# Patient Record
Sex: FEMALE | Race: WHITE | NOT HISPANIC OR LATINO | Employment: OTHER | URBAN - METROPOLITAN AREA
[De-identification: names, ages, dates, MRNs, and addresses within clinical notes are randomized per-mention and may not be internally consistent; named-entity substitution may affect disease eponyms.]

---

## 2022-11-07 ENCOUNTER — OFFICE VISIT (OUTPATIENT)
Dept: ORTHOPEDICS | Facility: CLINIC | Age: 72
End: 2022-11-07
Payer: MEDICARE

## 2022-11-07 ENCOUNTER — HOSPITAL ENCOUNTER (OUTPATIENT)
Dept: RADIOLOGY | Facility: HOSPITAL | Age: 72
Discharge: HOME | End: 2022-11-07
Attending: ORTHOPAEDIC SURGERY
Payer: MEDICARE

## 2022-11-07 DIAGNOSIS — M51.34 DDD (DEGENERATIVE DISC DISEASE), THORACIC: ICD-10-CM

## 2022-11-07 DIAGNOSIS — M51.34 DDD (DEGENERATIVE DISC DISEASE), THORACIC: Primary | ICD-10-CM

## 2022-11-07 DIAGNOSIS — S22.000G COMPRESSION FRACTURE OF THORACIC VERTEBRA WITH DELAYED HEALING, UNSPECIFIED THORACIC VERTEBRAL LEVEL, SUBSEQUENT ENCOUNTER: ICD-10-CM

## 2022-11-07 PROCEDURE — 1159F PR MEDICATION LIST DOCUMENTED IN MEDICAL RECORD: ICD-10-PCS | Mod: CPTII,S$GLB,, | Performed by: ORTHOPAEDIC SURGERY

## 2022-11-07 PROCEDURE — 1159F MED LIST DOCD IN RCRD: CPT | Mod: CPTII,S$GLB,, | Performed by: ORTHOPAEDIC SURGERY

## 2022-11-07 PROCEDURE — 72080 XR THORACOLUMBAR SPINE AP LATERAL: ICD-10-PCS | Mod: 26,,, | Performed by: RADIOLOGY

## 2022-11-07 PROCEDURE — 99999 PR PBB SHADOW E&M-NEW PATIENT-LVL II: CPT | Mod: PBBFAC,,, | Performed by: ORTHOPAEDIC SURGERY

## 2022-11-07 PROCEDURE — 1125F AMNT PAIN NOTED PAIN PRSNT: CPT | Mod: CPTII,S$GLB,, | Performed by: ORTHOPAEDIC SURGERY

## 2022-11-07 PROCEDURE — 72080 X-RAY EXAM THORACOLMB 2/> VW: CPT | Mod: TC

## 2022-11-07 PROCEDURE — 4010F PR ACE/ARB THEARPY RXD/TAKEN: ICD-10-PCS | Mod: CPTII,S$GLB,, | Performed by: ORTHOPAEDIC SURGERY

## 2022-11-07 PROCEDURE — 99999 PR PBB SHADOW E&M-NEW PATIENT-LVL II: ICD-10-PCS | Mod: PBBFAC,,, | Performed by: ORTHOPAEDIC SURGERY

## 2022-11-07 PROCEDURE — 1125F PR PAIN SEVERITY QUANTIFIED, PAIN PRESENT: ICD-10-PCS | Mod: CPTII,S$GLB,, | Performed by: ORTHOPAEDIC SURGERY

## 2022-11-07 PROCEDURE — 99204 PR OFFICE/OUTPT VISIT, NEW, LEVL IV, 45-59 MIN: ICD-10-PCS | Mod: S$GLB,,, | Performed by: ORTHOPAEDIC SURGERY

## 2022-11-07 PROCEDURE — 4010F ACE/ARB THERAPY RXD/TAKEN: CPT | Mod: CPTII,S$GLB,, | Performed by: ORTHOPAEDIC SURGERY

## 2022-11-07 PROCEDURE — 72080 X-RAY EXAM THORACOLMB 2/> VW: CPT | Mod: 26,,, | Performed by: RADIOLOGY

## 2022-11-07 PROCEDURE — 99204 OFFICE O/P NEW MOD 45 MIN: CPT | Mod: S$GLB,,, | Performed by: ORTHOPAEDIC SURGERY

## 2022-11-07 RX ORDER — COLESTIPOL HYDROCHLORIDE 5 G/5G
5 GRANULE, FOR SUSPENSION ORAL DAILY
COMMUNITY

## 2022-11-07 RX ORDER — METFORMIN HYDROCHLORIDE 500 MG/1
500 TABLET ORAL 2 TIMES DAILY WITH MEALS
COMMUNITY

## 2022-11-07 RX ORDER — AMLODIPINE BESYLATE 10 MG/1
5 TABLET ORAL DAILY
COMMUNITY

## 2022-11-16 ENCOUNTER — HOSPITAL ENCOUNTER (OUTPATIENT)
Dept: RADIOLOGY | Facility: HOSPITAL | Age: 72
Discharge: HOME | End: 2022-11-16
Attending: ORTHOPAEDIC SURGERY
Payer: MEDICARE

## 2022-11-16 DIAGNOSIS — S22.000G COMPRESSION FRACTURE OF THORACIC VERTEBRA WITH DELAYED HEALING, UNSPECIFIED THORACIC VERTEBRAL LEVEL, SUBSEQUENT ENCOUNTER: ICD-10-CM

## 2022-11-16 PROCEDURE — 72146 MRI CHEST SPINE W/O DYE: CPT | Mod: 26,,, | Performed by: RADIOLOGY

## 2022-11-16 PROCEDURE — 72146 MRI THORACIC SPINE WITHOUT CONTRAST: ICD-10-PCS | Mod: 26,,, | Performed by: RADIOLOGY

## 2022-11-16 PROCEDURE — 72146 MRI CHEST SPINE W/O DYE: CPT | Mod: TC

## 2023-01-01 NOTE — PROGRESS NOTES
DATE: 11/7/2022  PATIENT: Nia Godfrey    Supervising Physician: Javi Enriquez M.D.    CHIEF COMPLAINT: mid/low back pain    HISTORY:  Nia Godfrey is a 72 y.o. female here for initial evaluation of mid/low back pain (Back - 3). The pain has been present since a fall in February 2021. Pt tripped and fell forwards. At that point she was diagnosed with a thoracic compression fx that was treated conservatively, but she says the pain has not improved. The patient describes the pain as aching.  The pain is worse with standing and walking and improved by resting and leaning on the shopping cart. There is negative associated numbness and tingling. There is negative subjective weakness. Prior treatments have included no previous PT, ESIs, surgery.    The patient denies myelopathic symptoms such as handwriting changes or difficulty with buttons/coins/keys. Denies perineal paresthesias, bowel/bladder dysfunction.    PAST MEDICAL/SURGICAL HISTORY:  No past medical history on file.  No past surgical history on file.    Current Medications: No current outpatient medications on file.    Social History:   Social History     Socioeconomic History    Marital status:        REVIEW OF SYSTEMS:  Constitution: Negative. Negative for chills, fever and night sweats.   Cardiovascular: Negative for chest pain and syncope.   Respiratory: Negative for cough and shortness of breath.   Gastrointestinal: See HPI. Negative for nausea/vomiting. Negative for abdominal pain.  Genitourinary: See HPI. Negative for discoloration or dysuria.  Skin: Negative for dry skin, itching and rash.   Hematologic/Lymphatic: Negative for bleeding problem. Does not bruise/bleed easily.   Musculoskeletal: Negative for falls and muscle weakness.   Neurological: See HPI. No seizures.   Endocrine: Negative for polydipsia, polyphagia and polyuria.   Allergic/Immunologic: Negative for hives and persistent infections.    PHYSICAL EXAMINATION:    There were  no vitals taken for this visit.    General: The patient is a very pleasant 72 y.o. female in no apparent distress, the patient is oriented to person, place and time.   Psych: Normal mood and affect  HEENT: Vision grossly intact, hearing intact to the spoken word.  Lungs: Respirations unlabored.  Gait: Normal station and gait, no difficulty with toe or heel walk.   Skin: Dorsal lumbar skin negative for rashes, lesions, hairy patches and surgical scars.  Range of motion: Lumbar range of motion is acceptable. There is negative thoracolumbar tenderness to palpation.  Spinal Balance: Global saggital and coronal spinal balance acceptable, no significant for scoliosis and kyphosis.  Musculoskeletal: No pain with the range of motion of the bilateral hips. No trochanteric tenderness to palpation.  Vascular: Bilateral lower extremities warm and well perfused, Dorsalis pedis pulses 2+ bilaterally.  Neurological: Normal strength and tone in all major motor groups in the bilateral lower extremities. Normal sensation to light touch in the L2-S1 dermatomes bilaterally.  Deep tendon reflexes symmetric 2+ in the bilateral lower extremities.  Negative Babinski bilaterally.  Straight leg raise negative bilaterally.     IMAGING:   Today I personally reviewed AP, Lat upright thoracolumbar spine films that demonstrate age indeterminate 30-40% compression abnormality involving the anterior 80-90% of the T12 vertebral segment    There is no height or weight on file to calculate BMI.    No results found for: HGBA1C      ASSESSMENT/PLAN:    Diagnoses and all orders for this visit:    DDD (degenerative disc disease), thoracic  -     X-Ray Thoracolumbar Spine AP Lateral; Future      Today we discussed at length all of the different treatment options including anti-inflammatories, acetaminophen, rest, ice, heat, physical therapy including strengthening and stretching exercises, home exercises, ROM, aerobic conditioning, aqua therapy, other  modalities including ultrasound, massage, and dry needling, epidural steroid injections and finally surgical intervention.      Pt presents with chronic mid back pain. T12 compression fx on xray. Failure of conservative rx. Will obtain MRI to further evaluate and call with results.         .

## 2024-04-04 ENCOUNTER — OFFICE VISIT (OUTPATIENT)
Dept: PAIN MEDICINE | Facility: CLINIC | Age: 74
End: 2024-04-04
Payer: MEDICARE

## 2024-04-04 VITALS — WEIGHT: 169 LBS | BODY MASS INDEX: 31.1 KG/M2 | HEIGHT: 62 IN

## 2024-04-04 DIAGNOSIS — M47.814 ARTHROPATHY OF THORACIC FACET JOINT: ICD-10-CM

## 2024-04-04 DIAGNOSIS — G89.29 CHRONIC BILATERAL LOW BACK PAIN WITHOUT SCIATICA: ICD-10-CM

## 2024-04-04 DIAGNOSIS — M54.50 CHRONIC BILATERAL LOW BACK PAIN WITHOUT SCIATICA: ICD-10-CM

## 2024-04-04 DIAGNOSIS — M54.6 CHRONIC BILATERAL THORACIC BACK PAIN: Primary | ICD-10-CM

## 2024-04-04 DIAGNOSIS — M47.816 LUMBAR FACET ARTHROPATHY: ICD-10-CM

## 2024-04-04 DIAGNOSIS — G89.29 CHRONIC BILATERAL THORACIC BACK PAIN: Primary | ICD-10-CM

## 2024-04-04 PROCEDURE — 1101F PT FALLS ASSESS-DOCD LE1/YR: CPT | Mod: CPTII,S$GLB,, | Performed by: ANESTHESIOLOGY

## 2024-04-04 PROCEDURE — 4010F ACE/ARB THERAPY RXD/TAKEN: CPT | Mod: CPTII,S$GLB,, | Performed by: ANESTHESIOLOGY

## 2024-04-04 PROCEDURE — 99204 OFFICE O/P NEW MOD 45 MIN: CPT | Mod: S$GLB,,, | Performed by: ANESTHESIOLOGY

## 2024-04-04 PROCEDURE — 1159F MED LIST DOCD IN RCRD: CPT | Mod: CPTII,S$GLB,, | Performed by: ANESTHESIOLOGY

## 2024-04-04 PROCEDURE — 99999 PR PBB SHADOW E&M-EST. PATIENT-LVL III: CPT | Mod: PBBFAC,,, | Performed by: ANESTHESIOLOGY

## 2024-04-04 PROCEDURE — 3008F BODY MASS INDEX DOCD: CPT | Mod: CPTII,S$GLB,, | Performed by: ANESTHESIOLOGY

## 2024-04-04 PROCEDURE — 1160F RVW MEDS BY RX/DR IN RCRD: CPT | Mod: CPTII,S$GLB,, | Performed by: ANESTHESIOLOGY

## 2024-04-04 PROCEDURE — 3288F FALL RISK ASSESSMENT DOCD: CPT | Mod: CPTII,S$GLB,, | Performed by: ANESTHESIOLOGY

## 2024-04-04 RX ORDER — SITAGLIPTIN 100 MG/1
100 TABLET, FILM COATED ORAL
COMMUNITY
Start: 2024-02-23

## 2024-04-04 RX ORDER — GABAPENTIN 100 MG/1
100 CAPSULE ORAL 3 TIMES DAILY
Qty: 90 CAPSULE | Refills: 0 | Status: SHIPPED | OUTPATIENT
Start: 2024-04-04 | End: 2024-05-02

## 2024-04-04 RX ORDER — MONTELUKAST SODIUM 10 MG/1
10 TABLET ORAL
COMMUNITY
Start: 2024-03-12

## 2024-04-04 RX ORDER — METHOCARBAMOL 500 MG/1
500 TABLET, FILM COATED ORAL 3 TIMES DAILY PRN
Qty: 90 TABLET | Refills: 0 | Status: SHIPPED | OUTPATIENT
Start: 2024-04-04 | End: 2024-05-09 | Stop reason: SDUPTHER

## 2024-04-04 NOTE — PROGRESS NOTES
[facility] New Patient Evaluation    Nia Godfrey  : 1950  Date: 2024     CHIEF COMPLAINT:  Back Pain  Referring Physician: Cristiana Carrillo*  Primary Care Physician: Cristiana Carrillo, NP    HPI:  This is a 74 y.o. female with a chief complaint of Back Pain  . The patient has Past medical history/Past surgical history of hypertension, hyperlipidemia, diabetes, thoracic compression fracture T11-T12  Patient was evaluated and referred by PCP for low back pain.    Previously, she was evaluated by orthopedic provider in , patient had a recommendation to obtain MRI thoracic spine that showed remote compression fracture at T11 and T12(25 and 40% height loss respectively), degenerative changes, disc extrusion at T8-T9 with no high-grade thoracic spinal canal stenosis neural foramina narrowing    Diabetic: Yes    Anticogualtion drugs: None    Allergy To Iodine: No    Currently on Antibiotic: No    Current Description of Pain Symptoms:    History of Recent Fall or Trauma: No   Onset: Chronic, started 2 years ago(2/3/2022)  Pain Location:  Mid back and lower back(thoracic and lumbar, no SI joint pain, no radiation, no lower extremity symptoms)  Radiates/associated symptoms: none.   Pain is Getting worse over the last 6 months    The pain is described as aching.   Exacerbating factors: Standing and Walking.   Mitigating factors sitting.   Symptoms interfere with daily activity, sleeping.   The patient feels like symptoms have been worsening.   Patient denies night fever/night sweats, urinary incontinence, bowel incontinence, significant weight loss, significant motor weakness, and loss of sensations.    Pain score:   Current: 5/10  Best: 3/10  Worst: 10/10    Current pain medications:   tylenol and ibuprofen   Sometimes she takes percocet for your daughter, last time was in 2023 and during  time    Current Narcotics/Opioid /benzo Medications:  Opioids-  "None  Benzodiazepines: No    UDS:  NA    PDMP:  Reviewed and consistent with medication use as prescribed.     Previous Chronic Pain Treatment History:  Physical Therapy/HEP/Physician Lead Exercise Program:  Over the past 12 months, Patient has done 10 sessions.  PT response: Mildly Helpful.   Dates of the PT sessions: 2023 LOS in [place]   Is patient participating in home exercise program (HEP): Yes> 6 weeks over the last 6 months    Non-interventional Pain Therapy:  []Chiropractor.   []Acupuncture/Dry needle.  []TENS unit.  []Heat/ICE.  []Back Brace.    Medications previously tried:  NSAIDs: Meloxicam (Mobic)  Topical Agent: yes, Lidocaine patch   TCA/SSRI/SNRI: None  Anti-convulsants: None-never tried gabapentin or Lyrica  Muscle Relaxants: Flexeril (Cyclobenzaprine):  Sedation  Opioids- None.    Interventional Pain Procedures:  None     Previous spine/Relevant joint surgery:  none  Surgical History:   has a past surgical history that includes Hysterectomy and Cholecystectomy.  Medical History:   has a past medical history of Allergy, Asthma, Diabetes mellitus, type 2, Hypertension, and Osteopenia.  Family History:  family history includes Hypertension in her mother.  Allergies:  Atorvastatin and Codeine   Social History/SUBSTANCE ABUSE HISTORY:  Personal history of substance abuse: No   reports that she has been smoking cigarettes. She has never used smokeless tobacco.  LABS:  CBC  No results found for: "WBC", "HGB", "HCT"  Coagulation Profile   No results found for: "PLT"  No results found for: "PT", "PTT", "INR"  CMP:  BMP  No results found for: "NA", "K", "CL", "CO2", "BUN", "CREATININE", "CALCIUM", "ANIONGAP", "EGFRNORACEVR"  No results found for: "ALT", "AST", "GGT", "ALKPHOS", "BILITOT"  HGBA1C:  No results found for: "LABA1C", "HGBA1C"    ROS:    Review of Systems   GENERAL:  No weight loss, malaise or fevers.  HEENT:   No recent changes in vision or hearing  NECK:  Negative for lumps, no difficulty with " "swallowing.  RESPIRATORY:  Negative for cough, wheezing or shortness of breath, patient denies any recent URI.  CARDIOVASCULAR:  Negative for chest pain, leg swelling or palpitations.  GI:  Negative for abdominal discomfort, blood in stools or black stools or change in bowel habits.  MUSCULOSKELETAL:  See HPI.  SKIN:  Negative for lesions, rash, and itching.  PSYCH:  No mood disorder or recent psychosocial stressors.   HEMATOLOGY/LYMPHOLOGY:  See the blood thinner sectioned in HPI.  NEURO:  See HPI  All other reviewed and negative other than HPI.    PHYSICAL EXAM:  VITALS: Ht 5' 2" (1.575 m)   Wt 76.7 kg (169 lb)   BMI 30.91 kg/m²   Body mass index is 30.91 kg/m².  GENERAL: Well appearing, in no acute distress, alert and oriented x3, answers questions appropriately.   PSYCH: Flat affect.  SKIN: Skin color, texture, turgor normal, no rashes or lesions.  HEAD/FACE:  Normocephalic, atraumatic. Cranial nerves grossly intact.  CV: Regular rate  PULM: No evidence of respiratory difficulty, symmetric chest rise.  GI:  Soft and non-Distended.  BACK/SIJ/HIP:  Lumbar Spine Exam:       Inspection: No erythema, bruising, + kyphotic posture.      Palpation: (+) TTP of lumbar and thoracic paraspinals bilaterally      ROM:  Limited in flexion, extension, lateral bending.       (+) Facet loading bilaterally      (-) Straight Leg Raise bilaterally      (-) SHANTI bilaterally, Tenderness over the PSIS, Yeoman test  Hip Exam:      Inspection: No gross deformity or apparent leg length discrepancy      Palpation:  + TTP to bilateral greater trochanteric bursas.       ROM:  No limitation or pain in internal rotation, external rotation b/l    GAIT:  Antalgic gait    DIAGNOSTIC STUDIES AND MEDICAL RECORDS REVIEW:  MRI THORACIC SPINE WITHOUT CONTRAST 2022     No spondylolisthesis.  There is superior endplate compression fracture T11 with 25% height loss and at T12 with 40% height loss.  There is no marrow edema and either of these sites.  " There is a prominent Schmorl node superior endplate T11.  There is also a sub endplate lesion at T6 measuring 1 cm which is T1 hypointense T2 isointense and STIR hyperintense with subtle endplate discontinuity.  This can be seen sagittal series 8 there are no other of replacement multilevel desiccation.  Mild multilevel disc height loss at several mid and lower thoracic levels.  Thoracic cord is in contour and signal.     Partially imaged degenerative change at several lower cervical levels.  1.3 cm T2 hyperintense lesion in the right thyroid lobe incompletely characterized on series 8, image 3.  1.1 cm nodular focus along the right lower lobe at the base on series 8, image 25.     Facet osteophytes contribute to neural foraminal narrowing bilaterally at C5-6 and C6-7 and on the left at T9-10 and on the right at L3-4.     At T8-9 there is a small central disc extrusion with disc material extending 6 mm below the disc level in the anterior epidural space.  Partial effacement ventral CSF space.     At T10-11 there is minimal broad-based disc bulge.     At T11-12 there is 3 mm retropulsion of the superior endplate of T12 contributing to minimal bony spinal canal stenosis.     Impression:     1. No malalignment.  2. Remote compression fractures at T11 and T12.  3. Focal lesion in the T6 vertebra favored to reflect a Schmorl node in the acute phase.  4. Degenerative change at several lumbar levels including disc extrusion at T8-9.  No high-grade thoracic spinal canal or neural foraminal narrowing.  5. Nodular lesion in the right lower lobe.  Recommend further characterization with chest CT when feasible.  6. Partially imaged degenerative change in the lower cervical spine.  This could be further characterized with cervical spine MRI as warranted.  7. Right thyroid nodule which could be further characterized with ultrasound as warranted.     ASSESSMENT:  Nia Godfrey is a 74 y.o. female with the following diagnoses  based on history, exam, and imagin. Chronic bilateral thoracic back pain    2. Chronic bilateral low back pain without sciatica    3. Arthropathy of thoracic facet joint    4. Lumbar facet arthropathy   --------------------------------------  This is a pleasant 74 y.o. female patient with PMH hypertension, hyperlipidemia, diabetes, thoracic compression fracture T11-T12, presenting with thoracic and lower back pain, nonradiating, mild-to-moderate relief from multiple sessions of physical therapy completed over the last 6 months in , she takes Tylenol and ibuprofen with no relief, given her history of previous compression fracture I would like to repeat thoracic MRI and obtaining a new lumbar MRI to rule out any new compression fracture.     We discussed the underlying diagnoses and multiple treatment options including non-opioid medications, interventional procedures, physical therapy, home exercise, core muscle enhancement, and weight loss.  The risks and benefits of each treatment option were discussed and all questions were answered.      Treatment Plan:    Diagnostics/Referrals:  X-ray thoracic and lumbar spine, MRI thoracic and lumbar spine    Medications:    NSAIDs: Ibuprofen (Advil/Motrin)  Topical Agent: Yes  TCA/SSRI/SNRI: None  Anti-convulsants:  Start gabapentin 100 mg 3 times a day, prescription was provided for 30 days  Muscle Relaxants:  Start Robaxin 500 mg t.i.d. p.r.n., prescription was provided  Opioids: None    Interventional Therapy: Please schedule for None.  Sedation: None  Clearance to stop Blood thinner: Anticogualtion drugs: None    Regarding the above interventions, the patient has been educated regarding the risks (including bleeding, infection, increased pain, nerve damage, or allergic reaction), benefits, and alternatives. The patient states she understands and is eager to proceed.    Physical Rehabilitation: HEP.    Patient Education: Counseled patient regarding the importance  of activity modification, I have stressed the importance of physical activity and a home exercise plan to help with pain and improve health.    Follow-up: RTC 4 weeks to discuss imaging.    May consider:  Thoracic paravertebral block, thoracic epidural steroid injection, lumbar facet medial branch block, increasing gabapentin to 200 mg 3 times a day    I would like to thank Cristiana Carrillo for the opportunity to assist in the care of this patient. We had a very nice visit and I look forward to continuing their care. Please let me know if I can be of further assistance.     Eulalio Fajardo MD  Anesthesiologist  Interventional Pain Medicine  04/04/2024    Disclaimer:  This note was prepared using voice recognition system and is likely to have sound alike errors that may have been overlooked even after proof reading.  Please call me with any questions.

## 2024-04-04 NOTE — PROGRESS NOTES
[facility] New Patient Evaluation    Nia Godfrey  : 1950  Date: 2024     CHIEF COMPLAINT:  No chief complaint on file.  Referring Physician: Cristiana Carrillo*  Primary Care Physician: Cristiana Carrillo NP    HPI:  This is a 74 y.o. female with a chief complaint of No chief complaint on file.  . The patient has Past medical history/Past surgical history of hypertension, hyperlipidemia, diabetes, thoracic compression fracture T11-T12  Patient was evaluated and referred by PCP for low back pain.    Previously, she was evaluated by orthopedic provider in , patient had a recommendation to obtain MRI thoracic spine that showed remote compression fracture at T11 and T12(25 and 40% height loss respectively), degenerative changes, disc extrusion at T8-T9 with no high-grade thoracic spinal canal stenosis neural foramina narrowing    Diabetic: {GAYes/No/NA:93423}    {Anticogualtion drugs:01560}    Allergy To Iodine: {GAYes/No/NA:62204}    Currently on Antibiotic: {GAYes/No/NA:10572}    Current Description of Pain Symptoms:    History of Recent Fall or Trauma: {GAYes/No/NA:22377}   Onset: Chronic, started ***  Pain Location: ***  Radiates/associated symptoms: ***.   Pain is Getting worse over the last *** months    The pain is described as {Desc; pain character:25786}.   Exacerbating factors: {Causes; Pain:95687}.   Mitigating factors ***.   Symptoms interfere with daily activity, sleeping, and ***.   The patient feels like symptoms have been {IUW:57325}.   Patient {Denies / Reports:43063} {RED FLAGS:58836}.    Pain score:   Current: {PAIN 0-10:73668}/10  Best: {PAIN 0-10:89030}/10  Worst: {PAIN 0-10:44650}/10    Current pain medications:    ***    Current Narcotics/Opioid /benzo Medications:  Opioids- None  Benzodiazepines: No    UDS:  NA    PDMP:  Reviewed and consistent with medication use as prescribed.     Previous Chronic Pain Treatment History:  Physical  "Therapy/HEP/Physician Lead Exercise Program:  Over the past 12 months, Patient has done  *** sessions.  PT response: {PT response:86934} Helpful.   Dates of the PT sessions: ***, ***  Is patient participating in home exercise program (HEP): {GAYes/No/NA:72766}.    Non-interventional Pain Therapy:  []Chiropractor.   []Acupuncture/Dry needle.  []TENS unit.  []Heat/ICE.  []Back Brace.    Medications previously tried:  NSAIDs: {GANSIAD:54097}  Topical Agent: {GAYes/No/NA:11723}  TCA/SSRI/SNRI: {GATCA/SSRI/SNRI:49780}  Anti-convulsants: {GAAnticonvulsants:91124}  Muscle Relaxants: {GAmuscle Relaxant:22869}  Opioids- {GAopioid:43309}.    Interventional Pain Procedures:  ***    Previous spine/Relevant joint surgery:  ***  Surgical History:   has no past surgical history on file.  Medical History:   has no past medical history on file.  Family History:  family history is not on file.  Allergies:  Codeine   Social History/SUBSTANCE ABUSE HISTORY:  Personal history of substance abuse: No     LABS:  CBC  No results found for: "WBC", "HGB", "HCT"  Coagulation Profile   No results found for: "PLT"  No results found for: "PT", "PTT", "INR"  CMP:  BMP  No results found for: "NA", "K", "CL", "CO2", "BUN", "CREATININE", "CALCIUM", "ANIONGAP", "EGFRNORACEVR"  No results found for: "ALT", "AST", "GGT", "ALKPHOS", "BILITOT"  HGBA1C:  No results found for: "LABA1C", "HGBA1C"    ROS:    Review of Systems   GENERAL:  No weight loss, malaise or fevers.  HEENT:   No recent changes in vision or hearing  NECK:  Negative for lumps, no difficulty with swallowing.  RESPIRATORY:  Negative for cough, wheezing or shortness of breath, patient denies any recent URI.  CARDIOVASCULAR:  Negative for chest pain, leg swelling or palpitations.  GI:  Negative for abdominal discomfort, blood in stools or black stools or change in bowel habits.  MUSCULOSKELETAL:  See HPI.  SKIN:  Negative for lesions, rash, and itching.  PSYCH:  No mood disorder or recent " psychosocial stressors.   HEMATOLOGY/LYMPHOLOGY:  See the blood thinner sectioned in HPI.  NEURO:  See HPI  All other reviewed and negative other than HPI.    PHYSICAL EXAM:  VITALS: There were no vitals taken for this visit.  There is no height or weight on file to calculate BMI.  GENERAL: Well appearing, in no acute distress, alert and oriented x3, answers questions appropriately.   PSYCH: Flat affect.  SKIN: Skin color, texture, turgor normal, no rashes or lesions.  HEAD/FACE:  Normocephalic, atraumatic. Cranial nerves grossly intact.  CV: Regular rate  PULM: No evidence of respiratory difficulty, symmetric chest rise.  GI:  Soft and non-Distended.  NECK: (***) pain to palpation over the cervical paraspinous muscles. Spurling: ***. (***) pain with neck flexion, extension, or lateral flexion, Muscle strength in RT UE ***/5 and Left UE ***/5, Hand  5/5 bilaterally   BACK/SIJ/HIP:  Lumbar Spine Exam:       Inspection: No erythema, bruising.       Palpation: (***) TTP of lumbar paraspinals bilaterally      ROM:  Limited in flexion, extension, lateral bending.       (***) Facet loading bilaterally      (***) Straight Leg Raise bilaterally      (***) SHANTI bilaterally, Tenderness over the PSIS, Yeoman test  Hip Exam:      Inspection: No gross deformity or apparent leg length discrepancy      Palpation:  No TTP to bilateral greater trochanteric bursas.       ROM:  No limitation or pain in internal rotation, external rotation b/l  Neurologic Exam:     Alert. Speech is fluent and appropriate.     Strength: ***/5 in *** hip flexion and knee extension, otherwise 5/5 throughout bilateral lower extremities     Sensation:  Grossly intact to light touch in bilateral lower extremities     Tone: No abnormality appreciated in bilateral lower extremities  EXTREMITIES: Peripheral joint ROM is full and pain free without obvious instability or laxity in all four extremities. No deformities, edema, or skin discoloration.      MUSCULOSKELETAL: Shoulder, hip, and knee provocative maneuvers are negative.  Bilateral upper and lower extremity strength is normal and symmetric.  No atrophy or tone abnormalities are noted.    NEURO: Bilateral upper and lower extremity coordination and muscle stretch reflexes are physiologic and symmetric.  Plantar response are downgoing. No clonus.  No loss of sensation is noted.    GAIT: ***    DIAGNOSTIC STUDIES AND MEDICAL RECORDS REVIEW:  MRI THORACIC SPINE WITHOUT CONTRAST 2022     No spondylolisthesis.  There is superior endplate compression fracture T11 with 25% height loss and at T12 with 40% height loss.  There is no marrow edema and either of these sites.  There is a prominent Schmorl node superior endplate T11.  There is also a sub endplate lesion at T6 measuring 1 cm which is T1 hypointense T2 isointense and STIR hyperintense with subtle endplate discontinuity.  This can be seen sagittal series 8 there are no other of replacement multilevel desiccation.  Mild multilevel disc height loss at several mid and lower thoracic levels.  Thoracic cord is in contour and signal.     Partially imaged degenerative change at several lower cervical levels.  1.3 cm T2 hyperintense lesion in the right thyroid lobe incompletely characterized on series 8, image 3.  1.1 cm nodular focus along the right lower lobe at the base on series 8, image 25.     Facet osteophytes contribute to neural foraminal narrowing bilaterally at C5-6 and C6-7 and on the left at T9-10 and on the right at L3-4.     At T8-9 there is a small central disc extrusion with disc material extending 6 mm below the disc level in the anterior epidural space.  Partial effacement ventral CSF space.     At T10-11 there is minimal broad-based disc bulge.     At T11-12 there is 3 mm retropulsion of the superior endplate of T12 contributing to minimal bony spinal canal stenosis.     Impression:     1. No malalignment.  2. Remote compression fractures at  "T11 and T12.  3. Focal lesion in the T6 vertebra favored to reflect a Schmorl node in the acute phase.  4. Degenerative change at several lumbar levels including disc extrusion at T8-9.  No high-grade thoracic spinal canal or neural foraminal narrowing.  5. Nodular lesion in the right lower lobe.  Recommend further characterization with chest CT when feasible.  6. Partially imaged degenerative change in the lower cervical spine.  This could be further characterized with cervical spine MRI as warranted.  7. Right thyroid nodule which could be further characterized with ultrasound as warranted.     ASSESSMENT:  Nia Godfrey is a 74 y.o. female with the following diagnoses based on history, exam, and imaging:  There are no diagnoses linked to this encounter.   ---------------------------------------------------------------------  This is a pleasant 74 y.o. female patient with PMH hypertension, hyperlipidemia, diabetes, thoracic compression fracture T11-T12, presenting with***.     We discussed the underlying diagnoses and multiple treatment options including non-opioid medications, interventional procedures, physical therapy, home exercise, core muscle enhancement, and weight loss.  The risks and benefits of each treatment option were discussed and all questions were answered.      Treatment Plan:    Diagnostics/Referrals: *** .    Medications:    NSAIDs: {GANSIAD:19305}  Topical Agent: {GAYes/No/NA:25442}  TCA/SSRI/SNRI: {GATCA/SSRI/SNRI:87883}  Anti-convulsants: {GAAnticonvulsants:99221}  Muscle Relaxants: {GAmuscle Relaxant:72411}  Opioids: {GAopioid:51833::"None"}    Interventional Therapy: Please schedule for {Procedure:67097}.  Sedation: ***  Clearance to stop Blood thinner: {Anticogualtion drugs:17317}    Regarding the above interventions, the patient has been educated regarding the risks (including bleeding, infection, increased pain, nerve damage, or allergic reaction), benefits, and alternatives. The " patient states she understands and is eager to proceed.    Physical Rehabilitation: {blhtherapy:61771}.    Patient Education: Counseled patient regarding the importance of {:26914}, I have stressed the importance of physical activity and a home exercise plan to help with pain and improve health.    Follow-up: RTC ***.    May consider:     I would like to thank Cristiana Carrillo for the opportunity to assist in the care of this patient. We had a very nice visit and I look forward to continuing their care. Please let me know if I can be of further assistance.     Eulalio Fajardo MD  Anesthesiologist  Interventional Pain Medicine  04/04/2024    Disclaimer:  This note was prepared using voice recognition system and is likely to have sound alike errors that may have been overlooked even after proof reading.  Please call me with any questions.

## 2024-04-16 ENCOUNTER — HOSPITAL ENCOUNTER (OUTPATIENT)
Dept: RADIOLOGY | Facility: HOSPITAL | Age: 74
Discharge: HOME | End: 2024-04-16
Attending: ANESTHESIOLOGY
Payer: MEDICARE

## 2024-04-16 DIAGNOSIS — G89.29 CHRONIC BILATERAL LOW BACK PAIN WITHOUT SCIATICA: ICD-10-CM

## 2024-04-16 DIAGNOSIS — M54.50 CHRONIC BILATERAL LOW BACK PAIN WITHOUT SCIATICA: ICD-10-CM

## 2024-04-16 DIAGNOSIS — M54.6 CHRONIC BILATERAL THORACIC BACK PAIN: ICD-10-CM

## 2024-04-16 DIAGNOSIS — G89.29 CHRONIC BILATERAL THORACIC BACK PAIN: ICD-10-CM

## 2024-04-16 PROCEDURE — 72148 MRI LUMBAR SPINE W/O DYE: CPT | Mod: TC

## 2024-04-16 PROCEDURE — 72114 X-RAY EXAM L-S SPINE BENDING: CPT | Mod: TC

## 2024-04-16 PROCEDURE — 72146 MRI CHEST SPINE W/O DYE: CPT | Mod: 26,,, | Performed by: RADIOLOGY

## 2024-04-16 PROCEDURE — 72114 X-RAY EXAM L-S SPINE BENDING: CPT | Mod: 26,,, | Performed by: RADIOLOGY

## 2024-04-16 PROCEDURE — 72070 X-RAY EXAM THORAC SPINE 2VWS: CPT | Mod: TC

## 2024-04-16 PROCEDURE — 72070 X-RAY EXAM THORAC SPINE 2VWS: CPT | Mod: 26,,, | Performed by: RADIOLOGY

## 2024-04-16 PROCEDURE — 72146 MRI CHEST SPINE W/O DYE: CPT | Mod: TC

## 2024-04-16 PROCEDURE — 72148 MRI LUMBAR SPINE W/O DYE: CPT | Mod: 26,,, | Performed by: RADIOLOGY

## 2024-05-02 ENCOUNTER — TELEPHONE (OUTPATIENT)
Dept: PAIN MEDICINE | Facility: CLINIC | Age: 74
End: 2024-05-02

## 2024-05-02 ENCOUNTER — OFFICE VISIT (OUTPATIENT)
Dept: PAIN MEDICINE | Facility: CLINIC | Age: 74
End: 2024-05-02
Payer: MEDICARE

## 2024-05-02 DIAGNOSIS — M54.6 CHRONIC BILATERAL THORACIC BACK PAIN: Primary | ICD-10-CM

## 2024-05-02 DIAGNOSIS — G89.29 CHRONIC BILATERAL LOW BACK PAIN WITHOUT SCIATICA: ICD-10-CM

## 2024-05-02 DIAGNOSIS — S22.000A COMPRESSION FRACTURE OF BODY OF THORACIC VERTEBRA: ICD-10-CM

## 2024-05-02 DIAGNOSIS — G89.29 CHRONIC BILATERAL THORACIC BACK PAIN: Primary | ICD-10-CM

## 2024-05-02 DIAGNOSIS — M47.814 ARTHROPATHY OF THORACIC FACET JOINT: ICD-10-CM

## 2024-05-02 DIAGNOSIS — M54.50 CHRONIC BILATERAL LOW BACK PAIN WITHOUT SCIATICA: ICD-10-CM

## 2024-05-02 DIAGNOSIS — M47.816 LUMBAR FACET ARTHROPATHY: ICD-10-CM

## 2024-05-02 PROCEDURE — 1159F MED LIST DOCD IN RCRD: CPT | Mod: CPTII,S$GLB,, | Performed by: ANESTHESIOLOGY

## 2024-05-02 PROCEDURE — 99999 PR PBB SHADOW E&M-EST. PATIENT-LVL I: CPT | Mod: PBBFAC,,, | Performed by: ANESTHESIOLOGY

## 2024-05-02 PROCEDURE — 99214 OFFICE O/P EST MOD 30 MIN: CPT | Mod: S$GLB,,, | Performed by: ANESTHESIOLOGY

## 2024-05-02 PROCEDURE — 1160F RVW MEDS BY RX/DR IN RCRD: CPT | Mod: CPTII,S$GLB,, | Performed by: ANESTHESIOLOGY

## 2024-05-02 PROCEDURE — 4010F ACE/ARB THERAPY RXD/TAKEN: CPT | Mod: CPTII,S$GLB,, | Performed by: ANESTHESIOLOGY

## 2024-05-02 RX ORDER — GABAPENTIN 300 MG/1
300 CAPSULE ORAL 3 TIMES DAILY
Qty: 90 CAPSULE | Refills: 11 | Status: SHIPPED | OUTPATIENT
Start: 2024-05-02 | End: 2025-05-02

## 2024-05-02 NOTE — PROGRESS NOTES
[facility] EST Patient Evaluation    Nia Godfrey  : 1950  Date: 2024     CHIEF COMPLAINT:  Mid-back Pain (Thoracic and upper back)  Referring Physician: No ref. provider found  Primary Care Physician: Cristiana Carrillo NP    HPI:  This is a 74 y.o. female with a chief complaint of Mid-back Pain (Thoracic and upper back)  . The patient has Past medical history/Past surgical history of hypertension, hyperlipidemia, diabetes, thoracic compression fracture T11-T12  Patient was evaluated and referred by PCP for low back pain.    Previously, she was evaluated by orthopedic provider in , patient had a recommendation to obtain MRI thoracic spine that showed remote compression fracture at T11 and T12(25 and 40% height loss respectively), degenerative changes, disc extrusion at T8-T9 with no high-grade thoracic spinal canal stenosis neural foramina narrowing    Interval History 2024:  Nia Godfrey is here for follow up visit.  She is here to discuss MRI thoracic and lumbar spine which did not show acute compression fracture except chronic wedge compression fracture at T11-T12  She was started on gabapentin and Robaxin as needed for pain, however it did not provide significant relief.  Current pain 7/10    Diabetic: Yes    Anticogualtion drugs: None    Allergy To Iodine: No    Currently on Antibiotic: No    Current Description of Pain Symptoms:    History of Recent Fall or Trauma: No   Onset: Chronic, started 2 years ago (2/3/2022)  Pain Location:  Mid back and upper back(thoracic and lumbar, no SI joint pain, no radiation, no lower extremity symptoms)  Radiates/associated symptoms: none.   Pain is Getting worse over the last 6 months    The pain is described as aching.   Exacerbating factors: Standing and Walking.   Mitigating factors sitting.   Symptoms interfere with daily activity, sleeping.   The patient feels like symptoms have been worsening.   Patient denies night  "fever/night sweats, urinary incontinence, bowel incontinence, significant weight loss, significant motor weakness, and loss of sensations.    Pain score:     Best: 3/10  Worst: 10/10    Current pain medications:   tylenol and ibuprofen   Gabapentin 100 mg 3 times a day   Robaxin 500 mg 3 times a day  Sometimes she takes percocet for your daughter, last time was in November 2023  during Thanksgiving time    Current Narcotics/Opioid /benzo Medications:  Opioids- None  Benzodiazepines: No    UDS:  NA    PDMP:  Reviewed and consistent with medication use as prescribed.     Previous Chronic Pain Treatment History:  Physical Therapy/HEP/Physician Lead Exercise Program:  Over the past 12 months, Patient has done 10 sessions.  PT response: Mildly Helpful.   Dates of the PT sessions: 2023 LOS in [place]   Is patient participating in home exercise program (HEP): Yes> 6 weeks over the last 6 months    Non-interventional Pain Therapy:  []Chiropractor.   []Acupuncture/Dry needle.  []TENS unit.  []Heat/ICE.  []Back Brace.    Medications previously tried:  NSAIDs: Meloxicam (Mobic)  Topical Agent: yes, Lidocaine patch   TCA/SSRI/SNRI: None  Anti-convulsants: None-never tried gabapentin or Lyrica  Muscle Relaxants: Flexeril (Cyclobenzaprine):  Sedation  Opioids- None.    Interventional Pain Procedures:  None     Previous spine/Relevant joint surgery:  none  Surgical History:   has a past surgical history that includes Hysterectomy and Cholecystectomy.  Medical History:   has a past medical history of Allergy, Asthma, Diabetes mellitus, type 2, Hypertension, and Osteopenia.  Family History:  family history includes Hypertension in her mother.  Allergies:  Atorvastatin and Codeine   Social History/SUBSTANCE ABUSE HISTORY:  Personal history of substance abuse: No   reports that she has been smoking cigarettes. She has never used smokeless tobacco.  LABS:  CBC  No results found for: "WBC", "HGB", "HCT"  Coagulation Profile   No " "results found for: "PLT"  No results found for: "PT", "PTT", "INR"  CMP:  BMP  No results found for: "NA", "K", "CL", "CO2", "BUN", "CREATININE", "CALCIUM", "ANIONGAP", "EGFRNORACEVR"  No results found for: "ALT", "AST", "GGT", "ALKPHOS", "BILITOT"  HGBA1C:  No results found for: "LABA1C", "HGBA1C"    ROS:    Review of Systems   GENERAL:  No weight loss, malaise or fevers.  HEENT:   No recent changes in vision or hearing  NECK:  Negative for lumps, no difficulty with swallowing.  RESPIRATORY:  Negative for cough, wheezing or shortness of breath, patient denies any recent URI.  CARDIOVASCULAR:  Negative for chest pain, leg swelling or palpitations.  GI:  Negative for abdominal discomfort, blood in stools or black stools or change in bowel habits.  MUSCULOSKELETAL:  See HPI.  SKIN:  Negative for lesions, rash, and itching.  PSYCH:  No mood disorder or recent psychosocial stressors.   HEMATOLOGY/LYMPHOLOGY:  See the blood thinner sectioned in HPI.  NEURO:  See HPI  All other reviewed and negative other than HPI.    PHYSICAL EXAM:  VITALS: There were no vitals taken for this visit.  There is no height or weight on file to calculate BMI.  GENERAL: Well appearing, in no acute distress, alert and oriented x3, answers questions appropriately.   PSYCH: Flat affect.  SKIN: Skin color, texture, turgor normal, no rashes or lesions.  HEAD/FACE:  Normocephalic, atraumatic. Cranial nerves grossly intact.  CV: Regular rate  PULM: No evidence of respiratory difficulty, symmetric chest rise.  GI:  Soft and non-Distended.  BACK/SIJ/HIP:  Lumbar Spine Exam:       Inspection: No erythema, bruising, + kyphotic posture.      Palpation: (+) TTP of lumbar and thoracic paraspinals bilaterally      ROM:  Limited in flexion, extension, lateral bending.       (+) Facet loading bilaterally      (-) Straight Leg Raise bilaterally      (-) SHANTI bilaterally, Tenderness over the PSIS, Yeoman test  Hip Exam:      Inspection: No gross deformity or " apparent leg length discrepancy      Palpation:  + TTP to bilateral greater trochanteric bursas.       ROM:  No limitation or pain in internal rotation, external rotation b/l    GAIT:  Antalgic gait    DIAGNOSTIC STUDIES AND MEDICAL RECORDS REVIEW:  MRI THORACIC SPINE WITHOUT CONTRAST 2024       The incidentally visualized soft tissue structures of the chest and upper abdomen have a normal appearance.     Chronic compression deformities of the superior endplates of T11 and T12, without evidence for an acute compression fracture.  Remaining vertebral body heights are maintained.  There is disc desiccation with disc space narrowing throughout the thoracic spine.  The marrow signal is normal without evidence for a marrow replacement process, infection or tumor.  The spinal cord is normal in signal without cord edema or myelomalacia     At T8-9, there is a inferiorly migrated disc extrusion without central canal stenosis or neural foraminal narrowing.     At T10-11, circumferential disc bulging without central canal stenosis or neural foraminal narrowing.     At T11-12, circumferential disc bulging.  No central canal stenosis or neural foraminal narrowing.  Mild retropulsion of the superior endplate of T12.     Impression:     Chronic compression deformities of T11 and T12.  No acute compression fracture is seen.     Mild degenerative changes of the thoracic spine without central canal stenosis or neural foraminal narrowing.    MRI lumbar spine  Chronic compression deformities of the superior endplates of T11 and T12.  Remaining vertebral body heights are maintained.  There is disc desiccation throughout the lumbar spine with significant disc space narrowing at L5-S1.  The marrow signal is normal without evidence for a marrow replacement process, infection or tumor.  The conus terminates at T12-L1.     At T12-L1, small focal central disc protrusion without central canal stenosis or neural foraminal narrowing.     At  L1-2, circumferential disc bulge without central canal stenosis or neural foraminal narrowing.     At L2-3, circumferential disc bulge with superimposed foraminal disc protrusions, left greater than right.  No significant central canal stenosis or neural foraminal narrowing.     At L3-4, circumferential disc bulge with a right foraminal disc protrusion with ligamentum flavum hypertrophy and facet arthropathy contributing to moderate central canal stenosis with mild right neural foraminal narrowing.     At L4-5, circumferential disc bulge with ligamentum flavum hypertrophy and facet arthropathy contributing to moderate central canal stenosis with minimal bilateral neural foraminal narrowing.     At L5-S1, circumferential disc bulge extending into both neural foramina.  There is osseous spurring in the neural foramina with facet arthropathy.  There is mild central canal stenosis with mild moderate bilateral neural foraminal narrowing.     Impression:     Multilevel degenerative changes of the lumbar spine contributing to central canal stenosis and neural foraminal narrowing as detailed in the above level by level description.        ASSESSMENT:  Nia Godfrey is a 74 y.o. female with the following diagnoses based on history, exam, and imagin. Chronic bilateral thoracic back pain  - gabapentin (NEURONTIN) 300 MG capsule; Take 1 capsule (300 mg total) by mouth 3 (three) times daily.  Dispense: 90 capsule; Refill: 11    2. Chronic bilateral low back pain without sciatica    3. Arthropathy of thoracic facet joint    4. Lumbar facet arthropathy    5. Compression fracture of body of thoracic vertebra   ----------------------------------------------------------------------------  This is a pleasant 74 y.o. female patient with PMH hypertension, hyperlipidemia, diabetes, thoracic compression fracture T11-T12, presenting with thoracic and upper back pain, nonradiating, mild-to-moderate relief from multiple sessions of  physical therapy completed over the last 6 months in 2023, she takes Tylenol and ibuprofen with no relief, her updated MRI lumbar spine and thoracic spine did not show any acute compression fracture only degenerative changes and small disc extrusion at T8-T9 with no high-grade central or neural foramina narrowing which was similar to the last MRI thoracic spine in 2022, she had minimal benefit from gabapentin 100 mg 3 times a day, so I will increase it to 300 mg 3 times a day and I will schedule her for bilateral paravertebral nerve block at T8    Treatment Plan:    Diagnostics/Referrals:  Continue with a home exercise    Medications:    NSAIDs: Ibuprofen (Advil/Motrin)  Topical Agent: Yes  TCA/SSRI/SNRI: None  Anti-convulsants:  increase gabapentin to 300 mg 3 times a day, prescription was provided for 30 days  Muscle Relaxants:  DC Robaxin due to lack of efficacy  Opioids: None    Interventional Therapy: Please schedule for  Bilateral Paravertebral block atT 8 .  Sedation:  IV sedation  Clearance to stop Blood thinner: Anticogualtion drugs: None    Regarding the above interventions, the patient has been educated regarding the risks (including bleeding, infection, increased pain, nerve damage, or allergic reaction), benefits, and alternatives. The patient states she understands and is eager to proceed.    Physical Rehabilitation: HEP.    Patient Education: Counseled patient regarding the importance of activity modification, I have stressed the importance of physical activity and a home exercise plan to help with pain and improve health.    Follow-up: RTC after injection  May consider:  thoracic epidural steroid injection at T12-L1 versus T8-T9, she has significant posture problem due to exaggerated thoracic kyphosis    Eulalio Fajardo MD  Anesthesiologist  Interventional Pain Medicine  05/02/2024    Disclaimer:  This note was prepared using voice recognition system and is likely to have sound alike errors that may  have been overlooked even after proof reading.  Please call me with any questions.

## 2024-05-02 NOTE — TELEPHONE ENCOUNTER
----- Message from Eulalio Fajardo MD sent at 5/2/2024 11:48 AM CDT -----  Please schedule for  Bilateral Paravertebral block att T8   DM  Iv sedation  Eulalio Fajardo MD  Anesthesiologist  Interventional Pain Management  05/02/2024

## 2024-05-02 NOTE — H&P (VIEW-ONLY)
[facility] EST Patient Evaluation    Nia Godfrey  : 1950  Date: 2024     CHIEF COMPLAINT:  Mid-back Pain (Thoracic and upper back)  Referring Physician: No ref. provider found  Primary Care Physician: Cristiana Carrillo NP    HPI:  This is a 74 y.o. female with a chief complaint of Mid-back Pain (Thoracic and upper back)  . The patient has Past medical history/Past surgical history of hypertension, hyperlipidemia, diabetes, thoracic compression fracture T11-T12  Patient was evaluated and referred by PCP for low back pain.    Previously, she was evaluated by orthopedic provider in , patient had a recommendation to obtain MRI thoracic spine that showed remote compression fracture at T11 and T12(25 and 40% height loss respectively), degenerative changes, disc extrusion at T8-T9 with no high-grade thoracic spinal canal stenosis neural foramina narrowing    Interval History 2024:  Nia Godfrey is here for follow up visit.  She is here to discuss MRI thoracic and lumbar spine which did not show acute compression fracture except chronic wedge compression fracture at T11-T12  She was started on gabapentin and Robaxin as needed for pain, however it did not provide significant relief.  Current pain 7/10    Diabetic: Yes    Anticogualtion drugs: None    Allergy To Iodine: No    Currently on Antibiotic: No    Current Description of Pain Symptoms:    History of Recent Fall or Trauma: No   Onset: Chronic, started 2 years ago (2/3/2022)  Pain Location:  Mid back and upper back(thoracic and lumbar, no SI joint pain, no radiation, no lower extremity symptoms)  Radiates/associated symptoms: none.   Pain is Getting worse over the last 6 months    The pain is described as aching.   Exacerbating factors: Standing and Walking.   Mitigating factors sitting.   Symptoms interfere with daily activity, sleeping.   The patient feels like symptoms have been worsening.   Patient denies night  "fever/night sweats, urinary incontinence, bowel incontinence, significant weight loss, significant motor weakness, and loss of sensations.    Pain score:     Best: 3/10  Worst: 10/10    Current pain medications:   tylenol and ibuprofen   Gabapentin 100 mg 3 times a day   Robaxin 500 mg 3 times a day  Sometimes she takes percocet for your daughter, last time was in November 2023  during Thanksgiving time    Current Narcotics/Opioid /benzo Medications:  Opioids- None  Benzodiazepines: No    UDS:  NA    PDMP:  Reviewed and consistent with medication use as prescribed.     Previous Chronic Pain Treatment History:  Physical Therapy/HEP/Physician Lead Exercise Program:  Over the past 12 months, Patient has done 10 sessions.  PT response: Mildly Helpful.   Dates of the PT sessions: 2023 LOS in [place]   Is patient participating in home exercise program (HEP): Yes> 6 weeks over the last 6 months    Non-interventional Pain Therapy:  []Chiropractor.   []Acupuncture/Dry needle.  []TENS unit.  []Heat/ICE.  []Back Brace.    Medications previously tried:  NSAIDs: Meloxicam (Mobic)  Topical Agent: yes, Lidocaine patch   TCA/SSRI/SNRI: None  Anti-convulsants: None-never tried gabapentin or Lyrica  Muscle Relaxants: Flexeril (Cyclobenzaprine):  Sedation  Opioids- None.    Interventional Pain Procedures:  None     Previous spine/Relevant joint surgery:  none  Surgical History:   has a past surgical history that includes Hysterectomy and Cholecystectomy.  Medical History:   has a past medical history of Allergy, Asthma, Diabetes mellitus, type 2, Hypertension, and Osteopenia.  Family History:  family history includes Hypertension in her mother.  Allergies:  Atorvastatin and Codeine   Social History/SUBSTANCE ABUSE HISTORY:  Personal history of substance abuse: No   reports that she has been smoking cigarettes. She has never used smokeless tobacco.  LABS:  CBC  No results found for: "WBC", "HGB", "HCT"  Coagulation Profile   No " "results found for: "PLT"  No results found for: "PT", "PTT", "INR"  CMP:  BMP  No results found for: "NA", "K", "CL", "CO2", "BUN", "CREATININE", "CALCIUM", "ANIONGAP", "EGFRNORACEVR"  No results found for: "ALT", "AST", "GGT", "ALKPHOS", "BILITOT"  HGBA1C:  No results found for: "LABA1C", "HGBA1C"    ROS:    Review of Systems   GENERAL:  No weight loss, malaise or fevers.  HEENT:   No recent changes in vision or hearing  NECK:  Negative for lumps, no difficulty with swallowing.  RESPIRATORY:  Negative for cough, wheezing or shortness of breath, patient denies any recent URI.  CARDIOVASCULAR:  Negative for chest pain, leg swelling or palpitations.  GI:  Negative for abdominal discomfort, blood in stools or black stools or change in bowel habits.  MUSCULOSKELETAL:  See HPI.  SKIN:  Negative for lesions, rash, and itching.  PSYCH:  No mood disorder or recent psychosocial stressors.   HEMATOLOGY/LYMPHOLOGY:  See the blood thinner sectioned in HPI.  NEURO:  See HPI  All other reviewed and negative other than HPI.    PHYSICAL EXAM:  VITALS: There were no vitals taken for this visit.  There is no height or weight on file to calculate BMI.  GENERAL: Well appearing, in no acute distress, alert and oriented x3, answers questions appropriately.   PSYCH: Flat affect.  SKIN: Skin color, texture, turgor normal, no rashes or lesions.  HEAD/FACE:  Normocephalic, atraumatic. Cranial nerves grossly intact.  CV: Regular rate  PULM: No evidence of respiratory difficulty, symmetric chest rise.  GI:  Soft and non-Distended.  BACK/SIJ/HIP:  Lumbar Spine Exam:       Inspection: No erythema, bruising, + kyphotic posture.      Palpation: (+) TTP of lumbar and thoracic paraspinals bilaterally      ROM:  Limited in flexion, extension, lateral bending.       (+) Facet loading bilaterally      (-) Straight Leg Raise bilaterally      (-) SHANTI bilaterally, Tenderness over the PSIS, Yeoman test  Hip Exam:      Inspection: No gross deformity or " apparent leg length discrepancy      Palpation:  + TTP to bilateral greater trochanteric bursas.       ROM:  No limitation or pain in internal rotation, external rotation b/l    GAIT:  Antalgic gait    DIAGNOSTIC STUDIES AND MEDICAL RECORDS REVIEW:  MRI THORACIC SPINE WITHOUT CONTRAST 2024       The incidentally visualized soft tissue structures of the chest and upper abdomen have a normal appearance.     Chronic compression deformities of the superior endplates of T11 and T12, without evidence for an acute compression fracture.  Remaining vertebral body heights are maintained.  There is disc desiccation with disc space narrowing throughout the thoracic spine.  The marrow signal is normal without evidence for a marrow replacement process, infection or tumor.  The spinal cord is normal in signal without cord edema or myelomalacia     At T8-9, there is a inferiorly migrated disc extrusion without central canal stenosis or neural foraminal narrowing.     At T10-11, circumferential disc bulging without central canal stenosis or neural foraminal narrowing.     At T11-12, circumferential disc bulging.  No central canal stenosis or neural foraminal narrowing.  Mild retropulsion of the superior endplate of T12.     Impression:     Chronic compression deformities of T11 and T12.  No acute compression fracture is seen.     Mild degenerative changes of the thoracic spine without central canal stenosis or neural foraminal narrowing.    MRI lumbar spine  Chronic compression deformities of the superior endplates of T11 and T12.  Remaining vertebral body heights are maintained.  There is disc desiccation throughout the lumbar spine with significant disc space narrowing at L5-S1.  The marrow signal is normal without evidence for a marrow replacement process, infection or tumor.  The conus terminates at T12-L1.     At T12-L1, small focal central disc protrusion without central canal stenosis or neural foraminal narrowing.     At  L1-2, circumferential disc bulge without central canal stenosis or neural foraminal narrowing.     At L2-3, circumferential disc bulge with superimposed foraminal disc protrusions, left greater than right.  No significant central canal stenosis or neural foraminal narrowing.     At L3-4, circumferential disc bulge with a right foraminal disc protrusion with ligamentum flavum hypertrophy and facet arthropathy contributing to moderate central canal stenosis with mild right neural foraminal narrowing.     At L4-5, circumferential disc bulge with ligamentum flavum hypertrophy and facet arthropathy contributing to moderate central canal stenosis with minimal bilateral neural foraminal narrowing.     At L5-S1, circumferential disc bulge extending into both neural foramina.  There is osseous spurring in the neural foramina with facet arthropathy.  There is mild central canal stenosis with mild moderate bilateral neural foraminal narrowing.     Impression:     Multilevel degenerative changes of the lumbar spine contributing to central canal stenosis and neural foraminal narrowing as detailed in the above level by level description.        ASSESSMENT:  Nia Godfrey is a 74 y.o. female with the following diagnoses based on history, exam, and imagin. Chronic bilateral thoracic back pain  - gabapentin (NEURONTIN) 300 MG capsule; Take 1 capsule (300 mg total) by mouth 3 (three) times daily.  Dispense: 90 capsule; Refill: 11    2. Chronic bilateral low back pain without sciatica    3. Arthropathy of thoracic facet joint    4. Lumbar facet arthropathy    5. Compression fracture of body of thoracic vertebra   ----------------------------------------------------------------------------  This is a pleasant 74 y.o. female patient with PMH hypertension, hyperlipidemia, diabetes, thoracic compression fracture T11-T12, presenting with thoracic and upper back pain, nonradiating, mild-to-moderate relief from multiple sessions of  physical therapy completed over the last 6 months in 2023, she takes Tylenol and ibuprofen with no relief, her updated MRI lumbar spine and thoracic spine did not show any acute compression fracture only degenerative changes and small disc extrusion at T8-T9 with no high-grade central or neural foramina narrowing which was similar to the last MRI thoracic spine in 2022, she had minimal benefit from gabapentin 100 mg 3 times a day, so I will increase it to 300 mg 3 times a day and I will schedule her for bilateral paravertebral nerve block at T8    Treatment Plan:    Diagnostics/Referrals:  Continue with a home exercise    Medications:    NSAIDs: Ibuprofen (Advil/Motrin)  Topical Agent: Yes  TCA/SSRI/SNRI: None  Anti-convulsants:  increase gabapentin to 300 mg 3 times a day, prescription was provided for 30 days  Muscle Relaxants:  DC Robaxin due to lack of efficacy  Opioids: None    Interventional Therapy: Please schedule for  Bilateral Paravertebral block atT 8 .  Sedation:  IV sedation  Clearance to stop Blood thinner: Anticogualtion drugs: None    Regarding the above interventions, the patient has been educated regarding the risks (including bleeding, infection, increased pain, nerve damage, or allergic reaction), benefits, and alternatives. The patient states she understands and is eager to proceed.    Physical Rehabilitation: HEP.    Patient Education: Counseled patient regarding the importance of activity modification, I have stressed the importance of physical activity and a home exercise plan to help with pain and improve health.    Follow-up: RTC after injection  May consider:  thoracic epidural steroid injection at T12-L1 versus T8-T9, she has significant posture problem due to exaggerated thoracic kyphosis    Eulalio Fajardo MD  Anesthesiologist  Interventional Pain Medicine  05/02/2024    Disclaimer:  This note was prepared using voice recognition system and is likely to have sound alike errors that may  have been overlooked even after proof reading.  Please call me with any questions.

## 2024-05-02 NOTE — PROGRESS NOTES
[facility] EST Patient Evaluation    Nia Godfrey  : 1950  Date: 2024     CHIEF COMPLAINT:  No chief complaint on file.  Referring Physician: No ref. provider found  Primary Care Physician: Cristiana Carrillo NP    HPI:  This is a 74 y.o. female with a chief complaint of No chief complaint on file.  . The patient has Past medical history/Past surgical history of hypertension, hyperlipidemia, diabetes, thoracic compression fracture T11-T12  Patient was evaluated and referred by PCP for low back pain.    Previously, she was evaluated by orthopedic provider in , patient had a recommendation to obtain MRI thoracic spine that showed remote compression fracture at T11 and T12(25 and 40% height loss respectively), degenerative changes, disc extrusion at T8-T9 with no high-grade thoracic spinal canal stenosis neural foramina narrowing    Interval History 2024:  Nia Godfrey is here for follow up visit. ***    She was started on gabapentin and Robaxin as needed for pain  Diabetic: Yes    Anticogualtion drugs: None    Allergy To Iodine: No    Currently on Antibiotic: No    Current Description of Pain Symptoms:    History of Recent Fall or Trauma: No   Onset: Chronic, started 2 years ago(2/3/2022)  Pain Location:  Mid back and lower back(thoracic and lumbar, no SI joint pain, no radiation, no lower extremity symptoms)  Radiates/associated symptoms: none.   Pain is Getting worse over the last 6 months    The pain is described as aching.   Exacerbating factors: Standing and Walking.   Mitigating factors sitting.   Symptoms interfere with daily activity, sleeping.   The patient feels like symptoms have been worsening.   Patient denies night fever/night sweats, urinary incontinence, bowel incontinence, significant weight loss, significant motor weakness, and loss of sensations.    Pain score:   Current: 5/10  Best: 3/10  Worst: 10/10    Current pain medications:   tylenol and  "ibuprofen   Sometimes she takes percocet for your daughter, last time was in November 2023 and during Thanksgiving time    Current Narcotics/Opioid /benzo Medications:  Opioids- None  Benzodiazepines: No    UDS:  NA    PDMP:  Reviewed and consistent with medication use as prescribed.     Previous Chronic Pain Treatment History:  Physical Therapy/HEP/Physician Lead Exercise Program:  Over the past 12 months, Patient has done 10 sessions.  PT response: Mildly Helpful.   Dates of the PT sessions: 2023 LOS in [place]   Is patient participating in home exercise program (HEP): Yes> 6 weeks over the last 6 months    Non-interventional Pain Therapy:  []Chiropractor.   []Acupuncture/Dry needle.  []TENS unit.  []Heat/ICE.  []Back Brace.    Medications previously tried:  NSAIDs: Meloxicam (Mobic)  Topical Agent: yes, Lidocaine patch   TCA/SSRI/SNRI: None  Anti-convulsants: None-never tried gabapentin or Lyrica  Muscle Relaxants: Flexeril (Cyclobenzaprine):  Sedation  Opioids- None.    Interventional Pain Procedures:  None     Previous spine/Relevant joint surgery:  none  Surgical History:   has a past surgical history that includes Hysterectomy and Cholecystectomy.  Medical History:   has a past medical history of Allergy, Asthma, Diabetes mellitus, type 2, Hypertension, and Osteopenia.  Family History:  family history includes Hypertension in her mother.  Allergies:  Atorvastatin and Codeine   Social History/SUBSTANCE ABUSE HISTORY:  Personal history of substance abuse: No   reports that she has been smoking cigarettes. She has never used smokeless tobacco.  LABS:  CBC  No results found for: "WBC", "HGB", "HCT"  Coagulation Profile   No results found for: "PLT"  No results found for: "PT", "PTT", "INR"  CMP:  BMP  No results found for: "NA", "K", "CL", "CO2", "BUN", "CREATININE", "CALCIUM", "ANIONGAP", "EGFRNORACEVR"  No results found for: "ALT", "AST", "GGT", "ALKPHOS", "BILITOT"  HGBA1C:  No results found for: "LABA1C", " ""HGBA1C"    ROS:    Review of Systems   GENERAL:  No weight loss, malaise or fevers.  HEENT:   No recent changes in vision or hearing  NECK:  Negative for lumps, no difficulty with swallowing.  RESPIRATORY:  Negative for cough, wheezing or shortness of breath, patient denies any recent URI.  CARDIOVASCULAR:  Negative for chest pain, leg swelling or palpitations.  GI:  Negative for abdominal discomfort, blood in stools or black stools or change in bowel habits.  MUSCULOSKELETAL:  See HPI.  SKIN:  Negative for lesions, rash, and itching.  PSYCH:  No mood disorder or recent psychosocial stressors.   HEMATOLOGY/LYMPHOLOGY:  See the blood thinner sectioned in HPI.  NEURO:  See HPI  All other reviewed and negative other than HPI.    PHYSICAL EXAM:  VITALS: There were no vitals taken for this visit.  There is no height or weight on file to calculate BMI.  GENERAL: Well appearing, in no acute distress, alert and oriented x3, answers questions appropriately.   PSYCH: Flat affect.  SKIN: Skin color, texture, turgor normal, no rashes or lesions.  HEAD/FACE:  Normocephalic, atraumatic. Cranial nerves grossly intact.  CV: Regular rate  PULM: No evidence of respiratory difficulty, symmetric chest rise.  GI:  Soft and non-Distended.  BACK/SIJ/HIP:  Lumbar Spine Exam:       Inspection: No erythema, bruising, + kyphotic posture.      Palpation: (+) TTP of lumbar and thoracic paraspinals bilaterally      ROM:  Limited in flexion, extension, lateral bending.       (+) Facet loading bilaterally      (-) Straight Leg Raise bilaterally      (-) SHANTI bilaterally, Tenderness over the PSIS, Yeoman test  Hip Exam:      Inspection: No gross deformity or apparent leg length discrepancy      Palpation:  + TTP to bilateral greater trochanteric bursas.       ROM:  No limitation or pain in internal rotation, external rotation b/l    GAIT:  Antalgic gait    DIAGNOSTIC STUDIES AND MEDICAL RECORDS REVIEW:  MRI THORACIC SPINE WITHOUT CONTRAST 2022   "   No spondylolisthesis.  There is superior endplate compression fracture T11 with 25% height loss and at T12 with 40% height loss.  There is no marrow edema and either of these sites.  There is a prominent Schmorl node superior endplate T11.  There is also a sub endplate lesion at T6 measuring 1 cm which is T1 hypointense T2 isointense and STIR hyperintense with subtle endplate discontinuity.  This can be seen sagittal series 8 there are no other of replacement multilevel desiccation.  Mild multilevel disc height loss at several mid and lower thoracic levels.  Thoracic cord is in contour and signal.     Partially imaged degenerative change at several lower cervical levels.  1.3 cm T2 hyperintense lesion in the right thyroid lobe incompletely characterized on series 8, image 3.  1.1 cm nodular focus along the right lower lobe at the base on series 8, image 25.     Facet osteophytes contribute to neural foraminal narrowing bilaterally at C5-6 and C6-7 and on the left at T9-10 and on the right at L3-4.     At T8-9 there is a small central disc extrusion with disc material extending 6 mm below the disc level in the anterior epidural space.  Partial effacement ventral CSF space.     At T10-11 there is minimal broad-based disc bulge.     At T11-12 there is 3 mm retropulsion of the superior endplate of T12 contributing to minimal bony spinal canal stenosis.     Impression:     1. No malalignment.  2. Remote compression fractures at T11 and T12.  3. Focal lesion in the T6 vertebra favored to reflect a Schmorl node in the acute phase.  4. Degenerative change at several lumbar levels including disc extrusion at T8-9.  No high-grade thoracic spinal canal or neural foraminal narrowing.  5. Nodular lesion in the right lower lobe.  Recommend further characterization with chest CT when feasible.  6. Partially imaged degenerative change in the lower cervical spine.  This could be further characterized with cervical spine MRI as  warranted.  7. Right thyroid nodule which could be further characterized with ultrasound as warranted.     ASSESSMENT:  Nia Godfrey is a 74 y.o. female with the following diagnoses based on history, exam, and imaging:  There are no diagnoses linked to this encounter.   --------------------------------------  This is a pleasant 74 y.o. female patient with PMH hypertension, hyperlipidemia, diabetes, thoracic compression fracture T11-T12, presenting with thoracic and lower back pain, nonradiating, mild-to-moderate relief from multiple sessions of physical therapy completed over the last 6 months in 2023, she takes Tylenol and ibuprofen with no relief, given her history of previous compression fracture I would like to repeat thoracic MRI and obtaining a new lumbar MRI to rule out any new compression fracture.     We discussed the underlying diagnoses and multiple treatment options including non-opioid medications, interventional procedures, physical therapy, home exercise, core muscle enhancement, and weight loss.  The risks and benefits of each treatment option were discussed and all questions were answered.      Treatment Plan:    Diagnostics/Referrals:  X-ray thoracic and lumbar spine, MRI thoracic and lumbar spine    Medications:    NSAIDs: Ibuprofen (Advil/Motrin)  Topical Agent: Yes  TCA/SSRI/SNRI: None  Anti-convulsants:  Start gabapentin 100 mg 3 times a day, prescription was provided for 30 days  Muscle Relaxants:  Start Robaxin 500 mg t.i.d. p.r.n., prescription was provided  Opioids: None    Interventional Therapy: Please schedule for None.  Sedation: None  Clearance to stop Blood thinner: Anticogualtion drugs: None    Regarding the above interventions, the patient has been educated regarding the risks (including bleeding, infection, increased pain, nerve damage, or allergic reaction), benefits, and alternatives. The patient states she understands and is eager to proceed.    Physical Rehabilitation:  HEP.    Patient Education: Counseled patient regarding the importance of activity modification, I have stressed the importance of physical activity and a home exercise plan to help with pain and improve health.    Follow-up: RTC 4 weeks to discuss imaging.    May consider:  Thoracic paravertebral block, thoracic epidural steroid injection, lumbar facet medial branch block, increasing gabapentin to 200 mg 3 times a day        Eulalio Fajardo MD  Anesthesiologist  Interventional Pain Medicine  05/02/2024    Disclaimer:  This note was prepared using voice recognition system and is likely to have sound alike errors that may have been overlooked even after proof reading.  Please call me with any questions.

## 2024-05-09 DIAGNOSIS — G89.29 CHRONIC BILATERAL THORACIC BACK PAIN: ICD-10-CM

## 2024-05-09 DIAGNOSIS — M54.50 CHRONIC BILATERAL LOW BACK PAIN WITHOUT SCIATICA: ICD-10-CM

## 2024-05-09 DIAGNOSIS — M54.6 CHRONIC BILATERAL THORACIC BACK PAIN: ICD-10-CM

## 2024-05-09 DIAGNOSIS — G89.29 CHRONIC BILATERAL LOW BACK PAIN WITHOUT SCIATICA: ICD-10-CM

## 2024-05-09 RX ORDER — METHOCARBAMOL 500 MG/1
500 TABLET, FILM COATED ORAL 3 TIMES DAILY PRN
Qty: 90 TABLET | Refills: 0 | Status: SHIPPED | OUTPATIENT
Start: 2024-05-09

## 2024-05-09 NOTE — TELEPHONE ENCOUNTER
----- Message from Acacia Fan sent at 2024  7:12 AM CDT -----  Contact: [facility]  Nia Godfrey  MRN: [id number]  : 1950  PCP: Cristiana Carrillo  Home Phone      Not on file.  Work Phone      [phone]  Mobile          [phone]      MESSAGE: Patient needs a refill on Methocarbamol 500 mg 1 TID sent to [facility]/[place].        Phone: [phone]

## 2024-05-09 NOTE — TELEPHONE ENCOUNTER
NSAIDs: Ibuprofen (Advil/Motrin)  Topical Agent: Yes  TCA/SSRI/SNRI: None  Anti-convulsants:  increase gabapentin to 300 mg 3 times a day, prescription was provided for 30 days  Muscle Relaxants:  DC Robaxin due to lack of efficacy  Opioids: None    Patient is requesting a refill on Robaxin. Patient reports getting relief with medication.

## 2024-05-16 NOTE — PRE-PROCEDURE INSTRUCTIONS
Patient scheduled for pain management procedure with Dr. ro at [facility] on 5/17/24.  Patient denies any known infection or use of antibiotics in the past 2 weeks.  Patient denies any implanted electronic devices such as a pacemaker, stimulator, glucose meter, etc.    Instructed to arrive at 8 am.  NPO after midnight, all morning medications OK except anything for regulating blood sugar.  Must have transportation home.  Verbalizes understanding.

## 2024-05-17 ENCOUNTER — HOSPITAL ENCOUNTER (OUTPATIENT)
Facility: HOSPITAL | Age: 74
Discharge: HOME | End: 2024-05-17
Attending: ANESTHESIOLOGY | Admitting: ANESTHESIOLOGY
Payer: MEDICARE

## 2024-05-17 ENCOUNTER — HOSPITAL ENCOUNTER (OUTPATIENT)
Dept: RADIOLOGY | Facility: HOSPITAL | Age: 74
Discharge: HOME | End: 2024-05-17
Attending: ANESTHESIOLOGY | Admitting: ANESTHESIOLOGY
Payer: MEDICARE

## 2024-05-17 VITALS
HEART RATE: 76 BPM | DIASTOLIC BLOOD PRESSURE: 66 MMHG | OXYGEN SATURATION: 95 % | SYSTOLIC BLOOD PRESSURE: 159 MMHG | TEMPERATURE: 97 F | RESPIRATION RATE: 18 BRPM

## 2024-05-17 DIAGNOSIS — G89.29 CHRONIC BILATERAL THORACIC BACK PAIN: ICD-10-CM

## 2024-05-17 DIAGNOSIS — M54.6 CHRONIC BILATERAL THORACIC BACK PAIN: ICD-10-CM

## 2024-05-17 DIAGNOSIS — M54.6 PAIN IN THORACIC SPINE: Primary | ICD-10-CM

## 2024-05-17 DIAGNOSIS — R52 PAIN: ICD-10-CM

## 2024-05-17 LAB — GLUCOSE SERPL-MCNC: 124 MG/DL (ref 70–110)

## 2024-05-17 PROCEDURE — 63600175 PHARM REV CODE 636 W HCPCS: Performed by: ANESTHESIOLOGY

## 2024-05-17 PROCEDURE — 76000 FLUOROSCOPY <1 HR PHYS/QHP: CPT | Mod: TC

## 2024-05-17 PROCEDURE — 25000003 PHARM REV CODE 250: Performed by: ANESTHESIOLOGY

## 2024-05-17 PROCEDURE — 82962 GLUCOSE BLOOD TEST: CPT | Performed by: ANESTHESIOLOGY

## 2024-05-17 PROCEDURE — 64490 INJ PARAVERT F JNT C/T 1 LEV: CPT | Mod: 50 | Performed by: ANESTHESIOLOGY

## 2024-05-17 PROCEDURE — 64520 N BLOCK LUMBAR/THORACIC: CPT | Mod: 50,,, | Performed by: ANESTHESIOLOGY

## 2024-05-17 RX ORDER — MIDAZOLAM HYDROCHLORIDE 1 MG/ML
INJECTION INTRAMUSCULAR; INTRAVENOUS
Status: DISCONTINUED | OUTPATIENT
Start: 2024-05-17 | End: 2024-05-17 | Stop reason: HOSPADM

## 2024-05-17 RX ORDER — SODIUM CHLORIDE 9 MG/ML
500 INJECTION, SOLUTION INTRAVENOUS CONTINUOUS
Status: ACTIVE | OUTPATIENT
Start: 2024-05-17

## 2024-05-17 RX ORDER — LIDOCAINE HYDROCHLORIDE 10 MG/ML
INJECTION INFILTRATION; PERINEURAL
Status: DISCONTINUED | OUTPATIENT
Start: 2024-05-17 | End: 2024-05-17 | Stop reason: HOSPADM

## 2024-05-17 RX ORDER — FENTANYL CITRATE 50 UG/ML
INJECTION, SOLUTION INTRAMUSCULAR; INTRAVENOUS
Status: DISCONTINUED | OUTPATIENT
Start: 2024-05-17 | End: 2024-05-17 | Stop reason: HOSPADM

## 2024-05-17 RX ORDER — DEXAMETHASONE SODIUM PHOSPHATE 10 MG/ML
INJECTION INTRAMUSCULAR; INTRAVENOUS
Status: DISCONTINUED | OUTPATIENT
Start: 2024-05-17 | End: 2024-05-17 | Stop reason: HOSPADM

## 2024-05-17 RX ORDER — BUPIVACAINE HYDROCHLORIDE 2.5 MG/ML
INJECTION, SOLUTION EPIDURAL; INFILTRATION; INTRACAUDAL
Status: DISCONTINUED | OUTPATIENT
Start: 2024-05-17 | End: 2024-05-17 | Stop reason: HOSPADM

## 2024-05-17 NOTE — OP NOTE
Bilateral paravertebral block at T10     The procedure, risks, benefits, and options were discussed with the patient. There are no contraindications to the procedure. The patent expressed understanding and agreed to the procedure. Informed written consent was obtained prior to the start of the procedure and can be found in the patient's chart.    PATIENT NAME: Nia Godfrey   MRN: [id number]     DATE OF PROCEDURE: 05/17/2024    PROCEDURE:  Bilateral paravertebral block at T10 ultrasound-guided    PRE-OP DIAGNOSIS: Chronic bilateral thoracic back pain [M54.6, G89.29]    POST-OP DIAGNOSIS: Same    PHYSICIAN: Eulalio Fajardo MD      MEDICATIONS INJECTED: Preservative-free Decadron 10mg with 20cc of 0.25 bupivacaine    LOCAL ANESTHETIC INJECTED: Xylocaine 1%     SEDATION: Versed 1mg and Fentanyl 50mcg                                                                                                                                                                                     Conscious sedation ordered by M.D. Patient re-evaluation prior to administration of conscious sedation. No changes noted in patient's status from initial evaluation. The patient's vital signs were monitored by RN and patient remained hemodynamically stable throughout the procedure.  No case tracking events are documented in the log.    ESTIMATED BLOOD LOSS: None    COMPLICATIONS: None    TECHNIQUE: Time-out was performed to identify the patient and procedure to be performed. With the patient laying in a prone position, the surgical area was prepped and draped in the usual sterile fashion using ChloraPrep and a fenestrated drape. The level T10 was determined under fluoroscopy guidance. Skin anesthesia was achieved by injecting Lidocaine 1% over the injection site.  Using ultrasound paravertebral space at T9 was identified bilaterally, a 2 needle 20 gauge 3.5 in was advanced under direct visualization of ultrasound, after reaching to target a  negative aspiration was performed with no air or blood, a total of 20 cc of the above medicine injected bilaterally, no sign of intravascular spread was noted.    The patient was monitored after the procedure in the recovery area. They were given post-procedure and discharge instructions to follow at home. The patient was discharged in a stable condition.      Eulalio Fajardo MD

## 2024-05-17 NOTE — DISCHARGE INSTRUCTIONS
DIET: You may resume your normal diet today.    BATHING: You may resume your normal bathing.          You may shower, no hot water directly on site for 24 hours.    DRESSING: You may remove your bandage today.    ACTIVITY LEVEL: You may resume your normal activities 24 hours after your  procedure.    If you have received sedation or an anesthetic, you may feel sleepy                                                                          for several hours. Rest until you are more awake. Gradually resume your normal activities tomorrow.    If you have received sedation or an anesthetic, do not drive or operate heavy machinery for at least 24 hours.    MEDICATION: You may resume your normal medications today.    You will receive instructions for any pain prescriptions. Pain medications should be taken only as directed.    SPECIAL INSTRUCTIONS: No heat to the injection site for 24 hours including: bath or shower, heating pad, moist heat, hot tubs.    Use ice pack to injection site for any pain or discomfort. Apply ice pack to 20 minutes then remove for 20 minutes before re-applying to site.    WHEN TO CALL DOCTOR: Redness or swelling around injection site    Fever of 101F    Drainage (pus) from the injection site    For any continuous bleeding (some dried blood over the incision is normal).    FOLLOW UP: Follow up phone call will be made by office.    FOR EMERGENCIES: If any unusual problems or difficulties occur during clinic hours, call [phone] or [phone].        Discharge to: Home with a responsible adult.  Follow up: 2-4 weeks        Please call my office or pager at [phone] if experienced any weakness or loss of sensation, fever > 101.5, pain uncontrolled with oral medications, persistent nausea/vomiting/or diarrhea, redness or drainage from the incisions, or any other worrisome concerns. If physician on call was not reached or could not communicate with our office for any reason please go to the nearest  emergency department.      Eulalio Fajardo  05/17/2024

## 2024-05-17 NOTE — DISCHARGE SUMMARY
Discharge Note  Short Stay    Admit Date: 5/17/2024    Attending Physician: Eulalio Fajardo    Discharge Physician: Eulalio Fajardo    Discharge Date: 5/17/2024 9:51 AM    Procedure(s) (LRB):  PARAVERTEBRAL BLOCK (T8) (Bilateral)    Final Diagnosis: Chronic bilateral thoracic back pain [M54.6, G89.29]    Disposition: Home or self care    Patient Instructions:   Current Discharge Medication List        CONTINUE these medications which have NOT CHANGED    Details   amLODIPine (NORVASC) 10 MG tablet Take 5 mg by mouth once daily.      gabapentin (NEURONTIN) 300 MG capsule Take 1 capsule (300 mg total) by mouth 3 (three) times daily.  Qty: 90 capsule, Refills: 11    Associated Diagnoses: Chronic bilateral thoracic back pain      JANUVIA 100 mg Tab Take 100 mg by mouth.      montelukast (SINGULAIR) 10 mg tablet Take 10 mg by mouth.      colestipoL (COLESTID) 5 gram Pack Take 5 g by mouth once daily. Patient takes once daily before eating.      metFORMIN (GLUCOPHAGE) 500 MG tablet Take 500 mg by mouth 2 (two) times daily with meals. Patient is not positive in the mg that she takes      methocarbamoL (ROBAXIN) 500 MG Tab Take 1 tablet (500 mg total) by mouth 3 (three) times daily as needed (severe muscle spasm).  Qty: 90 tablet, Refills: 0    Associated Diagnoses: Chronic bilateral thoracic back pain; Chronic bilateral low back pain without sciatica             Discharge Diagnosis: Chronic bilateral thoracic back pain [M54.6, G89.29]  Condition on Discharge: Stable with no complications to procedure   Diet on Discharge: Same as before.  Activity: as per instruction sheet.  Discharge to: Home with a responsible adult.  Follow up: 2-4 weeks       Please call my office or pager at [phone] if experienced any weakness or loss of sensation, fever > 101.5, pain uncontrolled with oral medications, persistent nausea/vomiting/or diarrhea, redness or drainage from the incisions, or any other worrisome concerns. If physician on call  was not reached or could not communicate with our office for any reason please go to the nearest emergency department.     Eulalio Fajardo  05/17/2024

## 2024-07-16 ENCOUNTER — TELEPHONE (OUTPATIENT)
Dept: ORTHOPEDICS | Facility: CLINIC | Age: 74
End: 2024-07-16
Payer: MEDICARE

## 2024-07-16 NOTE — TELEPHONE ENCOUNTER
Referral faxed in   Attempted to contact pt's daughter as requested, no answer. LVM with reason for call and call back number.

## 2024-07-17 NOTE — PROGRESS NOTES
"DATE: 7/19/2024  PATIENT: Nia Godfrey    Attending Physician: Javi Enriquez M.D.    HISTORY:  Nia Godfrey is a 74 y.o. female who returns to me today for follow up.  She was last seen by RENO Soto on 11/7/22.  Today she is doing well but notes mid and low back pain that began after falling out of her rollator while on a cruise on July 9th. She also reports 10/10 right anterior leg pain that began after the fall as well. She states the right leg is now weak. She presents today in a wheelchair. She is currently taking Gabapentin and Tramadol for her pain with little relief.   The Patient denies myelopathic symptoms such as handwriting changes or difficulty with buttons/coins/keys. Denies perineal paresthesias, bowel/bladder dysfunction.    EXAM:  Ht 5' 2" (1.575 m)   Wt 76.7 kg (169 lb)   BMI 30.91 kg/m²     My physical examination was notable for the following findings:     Normal station and gait, no difficulty with toe or heel walk.   Dorsal lumbar skin negative for rashes, lesions, hairy patches and surgical scars. There is mild lumbar  and moderate thoracic tenderness to palpation.  Lumbar range of motion is acceptable.  Global saggital and coronal spinal balance acceptable, not significant for scoliosis and kyphosis.  No pain with the range of motion of the bilateral hips. No trochanteric tenderness to palpation.  Bilateral lower extremities warm and well perfused, dorsalis pedis pulses 2+ bilaterally.  decreased strength and tone in all major motor groups in the bilateral lower extremities. Normal sensation to light touch in the L2-S1 dermatomes bilaterally.  Deep tendon reflexes symmetric 2+ in the bilateral lower extremities.  Negative Babinski bilaterally. Straight leg raise negative bilaterally.      IMAGING:    Today I personally re- reviewed AP, Lat and Flex/Ex  upright L-spine that demonstrate moderate DDD throughout with multiple thoracic compression fracture.    Thoracic MRI shows " "chronic compression fractures at T11 and T12.     Lumbar MRI shows bilateral foraminal stenosis from L3-S1. No significant stenosis.     Body mass index is 30.91 kg/m².    No results found for: "HGBA1C"      ASSESSMENT/PLAN:    Diagnoses and all orders for this visit:    Dorsalgia, unspecified  -     MRI Lumbar Spine Without Contrast; Future    Compression fracture of T12 vertebra, sequela  -     X-Ray Lumbar Spine Ap Lateral w/Flex Ext; Future  -     X-Ray Thoracic Spine AP Lateral; Future  -     MRI Thoracic Spine Without Contrast; Future  -     MRI Lumbar Spine Without Contrast; Future  -     Back/Cervical Brace For Home Use  -     calcitonin, salmon, (FORTICAL) 200 unit/actuation nasal spray; 1 spray by Nasal route once daily.  -     meloxicam (MOBIC) 15 MG tablet; Take 1 tablet (15 mg total) by mouth once daily.    Lumbar radiculopathy  -     MRI Lumbar Spine Without Contrast; Future  -     gabapentin (NEURONTIN) 600 MG tablet; Take 1 tablet (600 mg total) by mouth 3 (three) times daily.  -     meloxicam (MOBIC) 15 MG tablet; Take 1 tablet (15 mg total) by mouth once daily.    Chronic bilateral thoracic back pain  -     methocarbamoL (ROBAXIN) 500 MG Tab; Take 1 tablet (500 mg total) by mouth 3 (three) times daily as needed (muscle spasms).    Chronic bilateral low back pain without sciatica  -     methocarbamoL (ROBAXIN) 500 MG Tab; Take 1 tablet (500 mg total) by mouth 3 (three) times daily as needed (muscle spasms).    Today we discussed at length all of the different treatment options including anti-inflammatories, acetaminophen, rest, ice, heat, physical therapy including strengthening and stretching exercises, home exercises, ROM, aerobic conditioning, aqua therapy, other modalities including ultrasound, massage, and dry needling, epidural steroid injections and finally surgical intervention.    New imaging ordered, I will call with results    "

## 2024-07-19 ENCOUNTER — OFFICE VISIT (OUTPATIENT)
Dept: ORTHOPEDICS | Facility: CLINIC | Age: 74
End: 2024-07-19
Payer: MEDICARE

## 2024-07-19 VITALS — HEIGHT: 62 IN | WEIGHT: 169 LBS | BODY MASS INDEX: 31.1 KG/M2

## 2024-07-19 DIAGNOSIS — G89.29 CHRONIC BILATERAL LOW BACK PAIN WITHOUT SCIATICA: ICD-10-CM

## 2024-07-19 DIAGNOSIS — M54.9 DORSALGIA, UNSPECIFIED: Primary | ICD-10-CM

## 2024-07-19 DIAGNOSIS — M54.16 LUMBAR RADICULOPATHY: ICD-10-CM

## 2024-07-19 DIAGNOSIS — S22.080S COMPRESSION FRACTURE OF T12 VERTEBRA, SEQUELA: ICD-10-CM

## 2024-07-19 DIAGNOSIS — G89.29 CHRONIC BILATERAL THORACIC BACK PAIN: ICD-10-CM

## 2024-07-19 DIAGNOSIS — M54.6 CHRONIC BILATERAL THORACIC BACK PAIN: ICD-10-CM

## 2024-07-19 DIAGNOSIS — M54.50 CHRONIC BILATERAL LOW BACK PAIN WITHOUT SCIATICA: ICD-10-CM

## 2024-07-19 PROCEDURE — 3008F BODY MASS INDEX DOCD: CPT | Mod: CPTII,S$GLB,, | Performed by: REGISTERED NURSE

## 2024-07-19 PROCEDURE — 4010F ACE/ARB THERAPY RXD/TAKEN: CPT | Mod: CPTII,S$GLB,, | Performed by: REGISTERED NURSE

## 2024-07-19 PROCEDURE — 99999 PR PBB SHADOW E&M-EST. PATIENT-LVL III: CPT | Mod: PBBFAC,,, | Performed by: REGISTERED NURSE

## 2024-07-19 PROCEDURE — 99215 OFFICE O/P EST HI 40 MIN: CPT | Mod: S$GLB,,, | Performed by: REGISTERED NURSE

## 2024-07-19 PROCEDURE — 1125F AMNT PAIN NOTED PAIN PRSNT: CPT | Mod: CPTII,S$GLB,, | Performed by: REGISTERED NURSE

## 2024-07-19 PROCEDURE — 1160F RVW MEDS BY RX/DR IN RCRD: CPT | Mod: CPTII,S$GLB,, | Performed by: REGISTERED NURSE

## 2024-07-19 PROCEDURE — 1159F MED LIST DOCD IN RCRD: CPT | Mod: CPTII,S$GLB,, | Performed by: REGISTERED NURSE

## 2024-07-19 RX ORDER — MELOXICAM 15 MG/1
15 TABLET ORAL DAILY
Qty: 14 TABLET | Refills: 0 | Status: SHIPPED | OUTPATIENT
Start: 2024-07-19

## 2024-07-19 RX ORDER — CALCITONIN SALMON 200 [IU]/.09ML
1 SPRAY, METERED NASAL DAILY
Qty: 1 ML | Refills: 0 | Status: SHIPPED | OUTPATIENT
Start: 2024-07-19 | End: 2025-07-19

## 2024-07-19 RX ORDER — METHOCARBAMOL 500 MG/1
500 TABLET, FILM COATED ORAL 3 TIMES DAILY PRN
Qty: 90 TABLET | Refills: 0 | Status: SHIPPED | OUTPATIENT
Start: 2024-07-19

## 2024-07-19 RX ORDER — GABAPENTIN 600 MG/1
600 TABLET ORAL 3 TIMES DAILY
Qty: 90 TABLET | Refills: 0 | Status: SHIPPED | OUTPATIENT
Start: 2024-07-19 | End: 2024-08-18

## 2024-07-23 NOTE — PROGRESS NOTES
Established Patient - Audio Only Telehealth Visit     The patient location is: [place]  The chief complaint leading to consultation is: MRI results  Visit type: Virtual visit with audio only (telephone)  Total time spent with patient: 15min     The reason for the audio only service rather than synchronous audio and video virtual visit was related to technical difficulties or patient preference/necessity.     Each patient to whom I provide medical services by telemedicine is:  (1) informed of the relationship between the physician and patient and the respective role of any other health care provider with respect to management of the patient; and (2) notified that they may decline to receive medical services by telemedicine and may withdraw from such care at any time. Patient verbally consented to receive this service via voice-only telephone call.    DATE: 7/31/2024  PATIENT: Nia Godfrey    Attending Physician: Javi Enriquez M.D.    HISTORY:  Nia Godfrey is a 74 y.o. female who returns to me today for MRI results.  She was last seen by me 7/19/2024.  Today she is doing well but notes a great improvement in her pain and weakness since her last visit with me.  The Patient denies myelopathic symptoms such as handwriting changes or difficulty with buttons/coins/keys. Denies perineal paresthesias, bowel/bladder dysfunction.    EXAM:  There were no vitals taken for this visit.  Stable.     IMAGING:    Today I personally re- reviewed AP, Lat and Flex/Ex  upright L-spine that demonstrate moderate DDD throughout with multiple thoracic compression fracture.     Thoracic MRI shows chronic compression fractures at T11 and T12.      Lumbar MRI shows moderate stenosis from L3-5 with bilateral foraminal stenosis from L3-S1.     Thoracic MRI shows recent mild compression fractures at the T2 and T3 vertebral body levels with mild height loss and vertebral body edema. Chronic superior endplate compression fractures at T11  "and T12, unchanged from 11/16/2022.     There is no height or weight on file to calculate BMI.    No results found for: "HGBA1C"      ASSESSMENT/PLAN:    Diagnoses and all orders for this visit:    Lumbar radiculopathy      Follow up in pain mgmt for RFAs.     This service was not originating from a related E/M service provided within the previous 7 days nor will  to an E/M service or procedure within the next 24 hours or my soonest available appointment.  Prevailing standard of care was able to be met in this audio-only visit.          "

## 2024-07-29 ENCOUNTER — HOSPITAL ENCOUNTER (OUTPATIENT)
Dept: RADIOLOGY | Facility: HOSPITAL | Age: 74
Discharge: HOME | End: 2024-07-29
Attending: REGISTERED NURSE
Payer: MEDICARE

## 2024-07-29 DIAGNOSIS — S22.080S COMPRESSION FRACTURE OF T12 VERTEBRA, SEQUELA: ICD-10-CM

## 2024-07-29 DIAGNOSIS — M54.9 DORSALGIA, UNSPECIFIED: ICD-10-CM

## 2024-07-29 DIAGNOSIS — M54.16 LUMBAR RADICULOPATHY: ICD-10-CM

## 2024-07-29 PROCEDURE — 72070 X-RAY EXAM THORAC SPINE 2VWS: CPT | Mod: 26,,, | Performed by: INTERNAL MEDICINE

## 2024-07-29 PROCEDURE — 72148 MRI LUMBAR SPINE W/O DYE: CPT | Mod: 26,,, | Performed by: INTERNAL MEDICINE

## 2024-07-29 PROCEDURE — 72148 MRI LUMBAR SPINE W/O DYE: CPT | Mod: TC

## 2024-07-29 PROCEDURE — 72110 X-RAY EXAM L-2 SPINE 4/>VWS: CPT | Mod: 26,,, | Performed by: INTERNAL MEDICINE

## 2024-07-29 PROCEDURE — 72110 X-RAY EXAM L-2 SPINE 4/>VWS: CPT | Mod: TC

## 2024-07-29 PROCEDURE — 72070 X-RAY EXAM THORAC SPINE 2VWS: CPT | Mod: TC

## 2024-07-29 PROCEDURE — 72146 MRI CHEST SPINE W/O DYE: CPT | Mod: TC

## 2024-07-31 ENCOUNTER — OFFICE VISIT (OUTPATIENT)
Dept: ORTHOPEDICS | Facility: CLINIC | Age: 74
End: 2024-07-31
Payer: MEDICARE

## 2024-07-31 ENCOUNTER — TELEPHONE (OUTPATIENT)
Dept: ORTHOPEDICS | Facility: CLINIC | Age: 74
End: 2024-07-31

## 2024-07-31 DIAGNOSIS — M54.16 LUMBAR RADICULOPATHY: Primary | ICD-10-CM

## 2024-07-31 NOTE — TELEPHONE ENCOUNTER
Spoke to patient regarding an appointment with Dr. Fajardo. Patient scheduled for 2:30 pm on 09/30/2024. Patient stated thank you. Thanks.

## 2024-08-03 DIAGNOSIS — M54.16 LUMBAR RADICULOPATHY: ICD-10-CM

## 2024-08-03 DIAGNOSIS — S22.080S COMPRESSION FRACTURE OF T12 VERTEBRA, SEQUELA: ICD-10-CM

## 2024-08-04 RX ORDER — MELOXICAM 15 MG/1
15 TABLET ORAL
Qty: 14 TABLET | Refills: 0 | OUTPATIENT
Start: 2024-08-04

## 2024-08-08 ENCOUNTER — PATIENT MESSAGE (OUTPATIENT)
Dept: ORTHOPEDICS | Facility: CLINIC | Age: 74
End: 2024-08-08
Payer: MEDICARE

## 2024-08-10 DIAGNOSIS — S22.080S COMPRESSION FRACTURE OF T12 VERTEBRA, SEQUELA: ICD-10-CM

## 2024-08-12 RX ORDER — CALCITONIN SALMON 200 [IU]/.09ML
SPRAY, METERED NASAL
Qty: 11.1 EACH | Refills: 1 | OUTPATIENT
Start: 2024-08-12

## 2025-02-03 ENCOUNTER — TELEPHONE (OUTPATIENT)
Dept: PAIN MEDICINE | Facility: CLINIC | Age: 75
End: 2025-02-03

## 2025-02-03 ENCOUNTER — OFFICE VISIT (OUTPATIENT)
Dept: PAIN MEDICINE | Facility: CLINIC | Age: 75
End: 2025-02-03
Payer: MEDICARE

## 2025-02-03 VITALS
BODY MASS INDEX: 31.28 KG/M2 | WEIGHT: 170 LBS | HEIGHT: 62 IN | DIASTOLIC BLOOD PRESSURE: 72 MMHG | HEART RATE: 78 BPM | SYSTOLIC BLOOD PRESSURE: 155 MMHG

## 2025-02-03 DIAGNOSIS — M47.816 LUMBAR SPONDYLOSIS: Primary | ICD-10-CM

## 2025-02-03 DIAGNOSIS — M54.50 CHRONIC BILATERAL LOW BACK PAIN WITHOUT SCIATICA: Primary | ICD-10-CM

## 2025-02-03 DIAGNOSIS — M47.816 LUMBAR FACET ARTHROPATHY: ICD-10-CM

## 2025-02-03 DIAGNOSIS — G89.29 CHRONIC BILATERAL LOW BACK PAIN WITHOUT SCIATICA: Primary | ICD-10-CM

## 2025-02-03 DIAGNOSIS — M51.360 DEGENERATION OF INTERVERTEBRAL DISC OF LUMBAR REGION WITH DISCOGENIC BACK PAIN: ICD-10-CM

## 2025-02-03 DIAGNOSIS — G89.4 CHRONIC PAIN SYNDROME: ICD-10-CM

## 2025-02-03 PROCEDURE — 1159F MED LIST DOCD IN RCRD: CPT | Mod: CPTII,S$GLB,, | Performed by: ANESTHESIOLOGY

## 2025-02-03 PROCEDURE — 4010F ACE/ARB THERAPY RXD/TAKEN: CPT | Mod: CPTII,S$GLB,, | Performed by: ANESTHESIOLOGY

## 2025-02-03 PROCEDURE — 1160F RVW MEDS BY RX/DR IN RCRD: CPT | Mod: CPTII,S$GLB,, | Performed by: ANESTHESIOLOGY

## 2025-02-03 PROCEDURE — 99214 OFFICE O/P EST MOD 30 MIN: CPT | Mod: S$GLB,,, | Performed by: ANESTHESIOLOGY

## 2025-02-03 PROCEDURE — 1125F AMNT PAIN NOTED PAIN PRSNT: CPT | Mod: CPTII,S$GLB,, | Performed by: ANESTHESIOLOGY

## 2025-02-03 PROCEDURE — 3078F DIAST BP <80 MM HG: CPT | Mod: CPTII,S$GLB,, | Performed by: ANESTHESIOLOGY

## 2025-02-03 PROCEDURE — 3077F SYST BP >= 140 MM HG: CPT | Mod: CPTII,S$GLB,, | Performed by: ANESTHESIOLOGY

## 2025-02-03 PROCEDURE — 99999 PR PBB SHADOW E&M-EST. PATIENT-LVL III: CPT | Mod: PBBFAC,,, | Performed by: ANESTHESIOLOGY

## 2025-02-03 PROCEDURE — 3008F BODY MASS INDEX DOCD: CPT | Mod: CPTII,S$GLB,, | Performed by: ANESTHESIOLOGY

## 2025-02-03 NOTE — PROGRESS NOTES
[facility] EST Patient Evaluation    Nia Godfrey  : 1950  Date: 2/3/2025     CHIEF COMPLAINT:  No chief complaint on file.  Referring Physician: CAMRON Morris NP  Primary Care Physician: Cristiana Carrillo NP    HPI:  This is a 75 y.o. female with a chief complaint of No chief complaint on file.  . The patient has Past medical history/Past surgical history of hypertension, hyperlipidemia, diabetes, thoracic compression fracture T11-T12  Patient was evaluated and referred by PCP for low back pain.    Previously, she was evaluated by orthopedic provider in , patient had a recommendation to obtain MRI thoracic spine that showed remote compression fracture at T11 and T12(25 and 40% height loss respectively), degenerative changes, disc extrusion at T8-T9 with no high-grade thoracic spinal canal stenosis neural foramina narrowing    Interval History 2024:  Nia Godfrey is here for follow up visit.  She is here to discuss MRI thoracic and lumbar spine which did not show acute compression fracture except chronic wedge compression fracture at T11-T12  She was started on gabapentin and Robaxin as needed for pain, however it did not provide significant relief.  Current pain 7/10    Interval History 2025:  Nia Godfrey is here for  follow up visit after bilateral paravertebral block at T10 ultrasound guided back in May 2024,  patient reported   In 2024 she was seen by CAMRON Morris N  for mid and low back pain that began after falling out of her rollator while on a cruise on     MRI thoracic spine showed new recent mild compression fracture of T2 and T3 vertebral body in addition to chronic superior endplate compression fracture at T11 and T12.   No new compression fracture of the lumbar spine   Patient was prescribed can subacromial, meloxicam, Robaxin in addition to the gabapentin  Current pain score: ***/10      Diabetic: Yes    Anticogualtion  drugs: None    Allergy To Iodine: No    Currently on Antibiotic: No    Current Description of Pain Symptoms:  History of Recent Fall or Trauma: {GAYes/No/NA:34754}   Onset: Chronic, started ***  Pain Location: ***  Radiates/associated symptoms: ***.   Pain is Getting worse over the last *** months    The pain is described as {Desc; pain character:08370}.   Exacerbating factors: {Causes; Pain:62209}.   Mitigating factors ***.   Symptoms interfere with daily activity, sleeping, and ***.   The patient feels like symptoms have been {IUW:64109}.   Patient {Denies / Reports:00189} {RED FLAGS:18912}.      Pain score:     Best: 3/10  Worst: 10/10    Current pain medications:  tylenol and ibuprofen   Gabapentin 100 mg 3 times a day   Robaxin 500 mg 3 times a day  Sometimes she takes percocet for your daughter, last time was in November 2023  during Thanksgiving time    Current Narcotics/Opioid /benzo Medications:  Opioids- None  Benzodiazepines: No    UDS:  NA    PDMP:  Reviewed and consistent with medication use as prescribed.     Previous Chronic Pain Treatment History:  Physical Therapy/HEP/Physician Lead Exercise Program:  Over the past 12 months, Patient has done 10 sessions.  PT response: Mildly Helpful.   Dates of the PT sessions: 2023 LOS in [place]   Is patient participating in home exercise program (HEP): Yes> 6 weeks over the last 6 months    Non-interventional Pain Therapy:  []Chiropractor.   []Acupuncture/Dry needle.  []TENS unit.  []Heat/ICE.  []Back Brace.    Medications previously tried:  NSAIDs: Meloxicam (Mobic)  Topical Agent: yes, Lidocaine patch   TCA/SSRI/SNRI: None  Anti-convulsants: None-never tried gabapentin or Lyrica  Muscle Relaxants: Flexeril (Cyclobenzaprine):  Sedation  Opioids- None.    Interventional Pain Procedures:  - 05/17/2024:  bilateral T10 paravertebral block    Previous spine/Relevant joint surgery:  none  Surgical History:   has a past surgical history that includes Hysterectomy;  "Cholecystectomy; and Injection of anesthetic agent around nerve (Bilateral, 5/17/2024).  Medical History:   has a past medical history of Allergy, Asthma, Diabetes mellitus, type 2, Hypertension, and Osteopenia.  Family History:  family history includes Hypertension in her mother.  Allergies:  Atorvastatin and Codeine   Social History/SUBSTANCE ABUSE HISTORY:  Personal history of substance abuse: No   reports that she has been smoking cigarettes. She has never used smokeless tobacco.  LABS:  CBC  No results found for: "WBC", "HGB", "HCT"  Coagulation Profile   No results found for: "PLT"  No results found for: "PT", "PTT", "INR"  CMP:  BMP  No results found for: "NA", "K", "CL", "CO2", "BUN", "CREATININE", "CALCIUM", "ANIONGAP", "EGFRNORACEVR"  No results found for: "ALT", "AST", "GGT", "ALKPHOS", "BILITOT"  HGBA1C:  No results found for: "LABA1C", "HGBA1C"    ROS:    Review of Systems   GENERAL:  No weight loss, malaise or fevers.  HEENT:   No recent changes in vision or hearing  NECK:  Negative for lumps, no difficulty with swallowing.  RESPIRATORY:  Negative for cough, wheezing or shortness of breath, patient denies any recent URI.  CARDIOVASCULAR:  Negative for chest pain, leg swelling or palpitations.  GI:  Negative for abdominal discomfort, blood in stools or black stools or change in bowel habits.  MUSCULOSKELETAL:  See HPI.  SKIN:  Negative for lesions, rash, and itching.  PSYCH:  No mood disorder or recent psychosocial stressors.   HEMATOLOGY/LYMPHOLOGY:  See the blood thinner sectioned in HPI.  NEURO:  See HPI  All other reviewed and negative other than HPI.    PHYSICAL EXAM:  VITALS: There were no vitals taken for this visit.  There is no height or weight on file to calculate BMI.  GENERAL: Well appearing, in no acute distress, alert and oriented x3, answers questions appropriately.   PSYCH: Flat affect.  SKIN: Skin color, texture, turgor normal, no rashes or lesions.  HEAD/FACE:  Normocephalic, " atraumatic. Cranial nerves grossly intact.  CV: Regular rate  PULM: No evidence of respiratory difficulty, symmetric chest rise.  GI:  Soft and non-Distended.  BACK/SIJ/HIP:  Lumbar Spine Exam:       Inspection: No erythema, bruising, + kyphotic posture.      Palpation: (+) TTP of lumbar and thoracic paraspinals bilaterally      ROM:  Limited in flexion, extension, lateral bending.       (+) Facet loading bilaterally      (-) Straight Leg Raise bilaterally      (-) SHANTI bilaterally, Tenderness over the PSIS, Yeoman test  Hip Exam:      Inspection: No gross deformity or apparent leg length discrepancy      Palpation:  + TTP to bilateral greater trochanteric bursas.       ROM:  No limitation or pain in internal rotation, external rotation b/l    GAIT:  Antalgic gait    DIAGNOSTIC STUDIES AND MEDICAL RECORDS REVIEW:  MRI THORACIC SPINE WITHOUT CONTRAST 2024    The incidentally visualized soft tissue structures of the chest and upper abdomen have a normal appearance.     Chronic compression deformities of the superior endplates of T11 and T12, without evidence for an acute compression fracture.  Remaining vertebral body heights are maintained.  There is disc desiccation with disc space narrowing throughout the thoracic spine.  The marrow signal is normal without evidence for a marrow replacement process, infection or tumor.  The spinal cord is normal in signal without cord edema or myelomalacia     At T8-9, there is a inferiorly migrated disc extrusion without central canal stenosis or neural foraminal narrowing.     At T10-11, circumferential disc bulging without central canal stenosis or neural foraminal narrowing.     At T11-12, circumferential disc bulging.  No central canal stenosis or neural foraminal narrowing.  Mild retropulsion of the superior endplate of T12.     Impression:     Chronic compression deformities of T11 and T12.  No acute compression fracture is seen.     Mild degenerative changes of the  thoracic spine without central canal stenosis or neural foraminal narrowing.    MRI lumbar spine  Chronic compression deformities of the superior endplates of T11 and T12.  Remaining vertebral body heights are maintained.  There is disc desiccation throughout the lumbar spine with significant disc space narrowing at L5-S1.  The marrow signal is normal without evidence for a marrow replacement process, infection or tumor.  The conus terminates at T12-L1.     At T12-L1, small focal central disc protrusion without central canal stenosis or neural foraminal narrowing.     At L1-2, circumferential disc bulge without central canal stenosis or neural foraminal narrowing.     At L2-3, circumferential disc bulge with superimposed foraminal disc protrusions, left greater than right.  No significant central canal stenosis or neural foraminal narrowing.     At L3-4, circumferential disc bulge with a right foraminal disc protrusion with ligamentum flavum hypertrophy and facet arthropathy contributing to moderate central canal stenosis with mild right neural foraminal narrowing.     At L4-5, circumferential disc bulge with ligamentum flavum hypertrophy and facet arthropathy contributing to moderate central canal stenosis with minimal bilateral neural foraminal narrowing.     At L5-S1, circumferential disc bulge extending into both neural foramina.  There is osseous spurring in the neural foramina with facet arthropathy.  There is mild central canal stenosis with mild moderate bilateral neural foraminal narrowing.     Impression:     Multilevel degenerative changes of the lumbar spine contributing to central canal stenosis and neural foraminal narrowing as detailed in the above level by level description.     -  MRI thoracic and lumbar spine 07/2024  MRI of the thoracic spine was performed with usual sequences and no contrast.  There is mild accentuation of the thoracic kyphosis.  There are multiple compression fractures.  Chronic  superior endplate compression fractures are seen at T12 and T11.  These do not have edema that would suggest that there recent, and they were both present on the MRI of d 11/16/2022.     In the upper thoracic spine there are additional compression fractures at T2 and T3 which appear more recent and have associated vertebral body edema.  The edema is along the superior endplate of T2 and more diffuse at T3.  These are both new compared to the prior MRI.  No retropulsion is seen.     Degenerative disc changes are also present throughout the thoracic spine with narrowing of the T3-4 through T9-10 disc space.  No significant thoracic spinal canal stenosis.  Tiny right paraspinal disc bulges present at T7-8, and at T8-9 there is a small central disc protrusion which is extruded inferiorly behind the T9 vertebra without cord impingement.     No abnormal cord signal is seen to suggest tumor, edema or myelomalacia.  No paraspinous lesions.       -  MRI lumbar spine 07/2024  T12-L1: Small central protrusion.  No canal or foraminal stenosis.     L1-2: Mild disc bulge.  No canal or foraminal stenosis.     L2-3: Disc bulge.  No canal stenosis.  Mild bilateral foraminal stenosis.     L3-4: Disc bulge.  Bilateral facet arthropathy and ligamentum flavum thickening.  Moderate canal stenosis.  Moderate right and mild left foraminal stenosis.     L4-5: Disc bulge.  Bilateral facet arthropathy and ligamentum flavum thickening.  Mild canal stenosis.  Mild bilateral foraminal stenosis.     L5-S1: Disc bulge.  Bilateral facet arthropathy.  Mild canal stenosis.  Mild bilateral foraminal stenosis.     Impression:     Chronic compression fractures of T11 and T12.  No compression fracture in the lumbar spine.     Degenerative changes as described, similar to 04/16/2024.     ASSESSMENT:  Nia Godfrey is a 75 y.o. female with the following diagnoses based on history, exam, and imaging:  There are no diagnoses linked to this encounter.    ----------------------------------------------------------------------------  This is a pleasant 75 y.o. female patient with PMH hypertension, hyperlipidemia, diabetes, thoracic compression fracture T11-T12, presenting with thoracic and upper back pain, nonradiating, mild-to-moderate relief from multiple sessions of physical therapy completed over the last 6 months in 2023, she takes Tylenol and ibuprofen with no relief, her updated MRI lumbar spine and thoracic spine did not show any acute compression fracture only degenerative changes and small disc extrusion at T8-T9 with no high-grade central or neural foramina narrowing which was similar to the last MRI thoracic spine in 2022, she had minimal benefit from gabapentin 100 mg 3 times a day, so I will increase it to 300 mg 3 times a day and I will schedule her for bilateral paravertebral nerve block at T8    Treatment Plan:    Diagnostics/Referrals:  Continue with a home exercise    Medications:    NSAIDs: Ibuprofen (Advil/Motrin)  Topical Agent: Yes  TCA/SSRI/SNRI: None  Anti-convulsants:  increase gabapentin to 300 mg 3 times a day, prescription was provided for 30 days  Muscle Relaxants:  DC Robaxin due to lack of efficacy  Opioids: None    Interventional Therapy: Please schedule for  Bilateral Paravertebral block atT 8 .  Sedation:  IV sedation  Clearance to stop Blood thinner: Anticogualtion drugs: None    Regarding the above interventions, the patient has been educated regarding the risks (including bleeding, infection, increased pain, nerve damage, or allergic reaction), benefits, and alternatives. The patient states she understands and is eager to proceed.    Physical Rehabilitation: HEP.    Patient Education: Counseled patient regarding the importance of activity modification, I have stressed the importance of physical activity and a home exercise plan to help with pain and improve health.    Follow-up: RTC after injection  May consider:  thoracic epidural  steroid injection at T12-L1 versus T8-T9, she has significant posture problem due to exaggerated thoracic kyphosis    Eulalio Fajardo MD  Anesthesiologist  Interventional Pain Medicine  02/03/2025    Disclaimer:  This note was prepared using voice recognition system and is likely to have sound alike errors that may have been overlooked even after proof reading.  Please call me with any questions.

## 2025-02-03 NOTE — H&P (VIEW-ONLY)
[facility] Patient Evaluation    Nia Godfrey  : 1950  Date: 2/3/2025     CHIEF COMPLAINT:  Low-back Pain  Referring Physician: CAMRON Morris NP  Primary Care Physician: Cristiana Carrillo NP    HPI:  This is a 75 y.o. female with a chief complaint of Low-back Pain  . The patient has Past medical history/Past surgical history of hypertension, hyperlipidemia, diabetes, thoracic compression fracture T11-T12  Patient was evaluated and referred by PCP for low back pain.    Previously, she was evaluated by orthopedic provider in , patient had a recommendation to obtain MRI thoracic spine that showed remote compression fracture at T11 and T12(25 and 40% height loss respectively), degenerative changes, disc extrusion at T8-T9 with no high-grade thoracic spinal canal stenosis neural foramina narrowing    Interval History 2024:  Nia Godfrey is here for follow up visit.  She is here to discuss MRI thoracic and lumbar spine which did not show acute compression fracture except chronic wedge compression fracture at T11-T12  She was started on gabapentin and Robaxin as needed for pain, however it did not provide significant relief.  Current pain 7/10    Interval History 2025:  Nia Godfrey is here for follow up visit after bilateral paravertebral block at T10 ultrasound guided back in May 2024,  patient reported 100 % relief  for her upper back pain   In 2024 she was seen by CAMRON Morris N  for mid and low back pain that began after falling out of her rollator while on a cruise on     MRI thoracic spine showed new recent mild compression fracture of T2 and T3 vertebral body in addition to chronic superior endplate compression fracture at T11 and T12.   No new compression fracture of the lumbar spine,  only facet arthropathy  Current pain score: 9/10      Diabetic: Yes    Anticoagulation drugs: None    Allergy To Iodine: No    Currently on  Antibiotic: No    Current Description of Pain Symptoms:  History of Recent Fall or Trauma: No   Onset: Chronic, started on going  Pain Location: Lower back, axial nonradiating  Radiates/associated symptoms: N/A.   Pain is Getting worse over the last 3 months    The pain is described as aching.   Exacerbating factors: Standing, Walking, Night Time, and Morning.   Mitigating factors Sitting.   Symptoms interfere with daily activity, sleeping.   The patient feels like symptoms have been worsening.   Patient denies night fever/night sweats, urinary incontinence, bowel incontinence, significant weight loss, significant motor weakness, and loss of sensations.      Pain score:  Best: 3/10  Worst: 10/10    Current pain medications:  tylenol and ibuprofen   Gabapentin 600 mg 3 times a day - no relief  Robaxin 500 mg 3 times a day  Meloxicam 15 mg, QHS  Current Narcotics/Opioid /benzo Medications:  Opioids- None  Benzodiazepines: No    UDS:  NA    PDMP:  Reviewed and consistent with medication use as prescribed.     Previous Chronic Pain Treatment History:  Physical Therapy/HEP/Physician Lead Exercise Program:    Is patient participating in home exercise program (HEP): Yes> 6 weeks over the last 6 months    Non-interventional Pain Therapy:  []Chiropractor.   []Acupuncture/Dry needle.  []TENS unit.  []Heat/ICE.  []Back Brace.    Medications previously tried:  NSAIDs: Meloxicam (Mobic)  Topical Agent: yes, Lidocaine patch   TCA/SSRI/SNRI: None  Anti-convulsants: None-never tried gabapentin or Lyrica  Muscle Relaxants: Flexeril (Cyclobenzaprine):  Sedation  Opioids- None.    Interventional Pain Procedures:  - 05/17/2024:  bilateral T10 paravertebral block 100% for upper back pain    Previous spine/Relevant joint surgery:  none  Surgical History:   has a past surgical history that includes Hysterectomy; Cholecystectomy; and Injection of anesthetic agent around nerve (Bilateral, 5/17/2024).  Medical History:   has a past medical  "history of Allergy, Asthma, Diabetes mellitus, type 2, Hypertension, and Osteopenia.  Family History:  family history includes Hypertension in her mother.  Allergies:  Alendronate, Atorvastatin, and Codeine   Social History/SUBSTANCE ABUSE HISTORY:  Personal history of substance abuse: No   reports that she has been smoking cigarettes. She has never used smokeless tobacco.  LABS:  CBC  No results found for: "WBC", "HGB", "HCT"  Coagulation Profile   No results found for: "PLT"  No results found for: "PT", "PTT", "INR"  CMP:  BMP  No results found for: "NA", "K", "CL", "CO2", "BUN", "CREATININE", "CALCIUM", "ANIONGAP", "EGFRNORACEVR"  No results found for: "ALT", "AST", "GGT", "ALKPHOS", "BILITOT"  HGBA1C:  No results found for: "LABA1C", "HGBA1C"    ROS:    Review of Systems   GENERAL:  No weight loss, malaise or fevers.  HEENT:   No recent changes in vision or hearing  NECK:  Negative for lumps, no difficulty with swallowing.  RESPIRATORY:  Negative for cough, wheezing or shortness of breath, patient denies any recent URI.  CARDIOVASCULAR:  Negative for chest pain, leg swelling or palpitations.  GI:  Negative for abdominal discomfort, blood in stools or black stools or change in bowel habits.  MUSCULOSKELETAL:  See HPI.  SKIN:  Negative for lesions, rash, and itching.  PSYCH:  No mood disorder or recent psychosocial stressors.   HEMATOLOGY/LYMPHOLOGY:  See the blood thinner sectioned in HPI.  NEURO:  See HPI  All other reviewed and negative other than HPI.    PHYSICAL EXAM:  VITALS: BP (!) 155/72 (BP Location: Right arm, Patient Position: Sitting)   Pulse 78   Ht 5' 2" (1.575 m)   Wt 77.1 kg (170 lb)   BMI 31.09 kg/m²   Body mass index is 31.09 kg/m².  GENERAL: Well appearing, in no acute distress, alert and oriented x3, answers questions appropriately.   PSYCH: Flat affect.  SKIN: Skin color, texture, turgor normal, no rashes or lesions.  HEAD/FACE:  Normocephalic, atraumatic. Cranial nerves grossly " intact.  CV: Regular rate  PULM: No evidence of respiratory difficulty, symmetric chest rise.  GI:  Soft and non-Distended.  BACK/SIJ/HIP:  Lumbar Spine Exam:       Inspection: No erythema, bruising, + kyphotic posture.      Palpation: (+) TTP of lumbar and thoracic paraspinals bilaterally      ROM:  Limited in flexion, extension, lateral bending.       (+++) Facet loading bilaterally      (-) Straight Leg Raise bilaterally      (-) SHANTI bilaterally, Tenderness over the PSIS, Yeoman test  Hip Exam:      Inspection: No gross deformity or apparent leg length discrepancy      Palpation:  + TTP to bilateral greater trochanteric bursas.       ROM:  No limitation or pain in internal rotation, external rotation b/l    GAIT:  Antalgic gait    DIAGNOSTIC STUDIES AND MEDICAL RECORDS REVIEW:  MRI THORACIC SPINE WITHOUT CONTRAST 2024    The incidentally visualized soft tissue structures of the chest and upper abdomen have a normal appearance.     Chronic compression deformities of the superior endplates of T11 and T12, without evidence for an acute compression fracture.  Remaining vertebral body heights are maintained.  There is disc desiccation with disc space narrowing throughout the thoracic spine.  The marrow signal is normal without evidence for a marrow replacement process, infection or tumor.  The spinal cord is normal in signal without cord edema or myelomalacia     At T8-9, there is a inferiorly migrated disc extrusion without central canal stenosis or neural foraminal narrowing.     At T10-11, circumferential disc bulging without central canal stenosis or neural foraminal narrowing.     At T11-12, circumferential disc bulging.  No central canal stenosis or neural foraminal narrowing.  Mild retropulsion of the superior endplate of T12.     Impression:     Chronic compression deformities of T11 and T12.  No acute compression fracture is seen.     Mild degenerative changes of the thoracic spine without central canal  stenosis or neural foraminal narrowing.    MRI lumbar spine  Chronic compression deformities of the superior endplates of T11 and T12.  Remaining vertebral body heights are maintained.  There is disc desiccation throughout the lumbar spine with significant disc space narrowing at L5-S1.  The marrow signal is normal without evidence for a marrow replacement process, infection or tumor.  The conus terminates at T12-L1.     At T12-L1, small focal central disc protrusion without central canal stenosis or neural foraminal narrowing.     At L1-2, circumferential disc bulge without central canal stenosis or neural foraminal narrowing.     At L2-3, circumferential disc bulge with superimposed foraminal disc protrusions, left greater than right.  No significant central canal stenosis or neural foraminal narrowing.     At L3-4, circumferential disc bulge with a right foraminal disc protrusion with ligamentum flavum hypertrophy and facet arthropathy contributing to moderate central canal stenosis with mild right neural foraminal narrowing.     At L4-5, circumferential disc bulge with ligamentum flavum hypertrophy and facet arthropathy contributing to moderate central canal stenosis with minimal bilateral neural foraminal narrowing.     At L5-S1, circumferential disc bulge extending into both neural foramina.  There is osseous spurring in the neural foramina with facet arthropathy.  There is mild central canal stenosis with mild moderate bilateral neural foraminal narrowing.     Impression:     Multilevel degenerative changes of the lumbar spine contributing to central canal stenosis and neural foraminal narrowing as detailed in the above level by level description.     -  MRI thoracic and lumbar spine 07/2024  MRI of the thoracic spine was performed with usual sequences and no contrast.  There is mild accentuation of the thoracic kyphosis.  There are multiple compression fractures.  Chronic superior endplate compression  fractures are seen at T12 and T11.  These do not have edema that would suggest that there recent, and they were both present on the MRI of d 2022.     In the upper thoracic spine there are additional compression fractures at T2 and T3 which appear more recent and have associated vertebral body edema.  The edema is along the superior endplate of T2 and more diffuse at T3.  These are both new compared to the prior MRI.  No retropulsion is seen.     Degenerative disc changes are also present throughout the thoracic spine with narrowing of the T3-4 through T9-10 disc space.  No significant thoracic spinal canal stenosis.  Tiny right paraspinal disc bulges present at T7-8, and at T8-9 there is a small central disc protrusion which is extruded inferiorly behind the T9 vertebra without cord impingement.     No abnormal cord signal is seen to suggest tumor, edema or myelomalacia.  No paraspinous lesions.       -  MRI lumbar spine 2024  T12-L1: Small central protrusion.  No canal or foraminal stenosis.     L1-2: Mild disc bulge.  No canal or foraminal stenosis.     L2-3: Disc bulge.  No canal stenosis.  Mild bilateral foraminal stenosis.     L3-4: Disc bulge.  Bilateral facet arthropathy and ligamentum flavum thickening.  Moderate canal stenosis.  Moderate right and mild left foraminal stenosis.     L4-5: Disc bulge.  Bilateral facet arthropathy and ligamentum flavum thickening.  Mild canal stenosis.  Mild bilateral foraminal stenosis.     L5-S1: Disc bulge.  Bilateral facet arthropathy.  Mild canal stenosis.  Mild bilateral foraminal stenosis.     Impression:     Chronic compression fractures of T11 and T12.  No compression fracture in the lumbar spine.     Degenerative changes as described, similar to 2024.     ASSESSMENT:  Nia Godfrey is a 75 y.o. female with the following diagnoses based on history, exam, and imagin. Chronic bilateral low back pain without sciatica    2. Lumbar facet  arthropathy    3. Degeneration of intervertebral disc of lumbar region with discogenic back pain    4. Chronic pain syndrome     ----------------------------------------------------------------------------  This is a pleasant 75 y.o. female patient with PMH hypertension, hyperlipidemia, diabetes, thoracic compression fracture T11-T12, presenting with  axial low back pain, nonradiating, MRI lumbar spine showed multilevel facet arthropathy  with no significant spinal or neural foramina narrowing,  MRI lumbar spine showed significant L5-S1 degenerative disc disease    Treatment Plan:    Diagnostics/Referrals:  Continue with a home exercise    Medications:    NSAIDs: Ibuprofen (Advil/Motrin)  Topical Agent: Yes  TCA/SSRI/SNRI: None  Anti-convulsants:  DC gabapentin due to lack of efficacy  Muscle Relaxants:  DC Robaxin due to lack of efficacy  Opioids: None    Interventional Therapy: Please schedule for Bilateral LMBB at L4-5 and L5-S1  Sedation:  oral sedation  Clearance to stop Blood thinner: Anticoagulation drugs: None    Regarding the above interventions, the patient has been educated regarding the risks (including bleeding, infection, increased pain, nerve damage, or allergic reaction), benefits, and alternatives. The patient states she understands and is eager to proceed.    Physical Rehabilitation: HEP.    Patient Education: Counseled patient regarding the importance of activity modification, I have stressed the importance of physical activity and a home exercise plan to help with pain and improve health.    Follow-up: RTC after injection    May consider:   via disc procedure by at L5-S1 if she has no relief from lumbar medial branch block.    Eulalio Fajardo MD  Anesthesiologist  Interventional Pain Medicine  02/03/2025    Disclaimer:  This note was prepared using voice recognition system and is likely to have sound alike errors that may have been overlooked even after proof reading.  Please call me with any  questions.

## 2025-02-03 NOTE — TELEPHONE ENCOUNTER
----- Message from Eulalio Fajardo MD sent at 2/3/2025  3:17 PM CST -----  · Interventional Therapy: Please schedule for Bilateral LMBB at L4-5 and L5-S1  · Sedation:  oral sedation  · Clearance to stop Blood thinner: Anticoagulation drugs: None

## 2025-02-03 NOTE — PROGRESS NOTES
[facility] Patient Evaluation    Nia Godfrey  : 1950  Date: 2/3/2025     CHIEF COMPLAINT:  Low-back Pain  Referring Physician: CAMRON Morris NP  Primary Care Physician: Cristiana Carrillo NP    HPI:  This is a 75 y.o. female with a chief complaint of Low-back Pain  . The patient has Past medical history/Past surgical history of hypertension, hyperlipidemia, diabetes, thoracic compression fracture T11-T12  Patient was evaluated and referred by PCP for low back pain.    Previously, she was evaluated by orthopedic provider in , patient had a recommendation to obtain MRI thoracic spine that showed remote compression fracture at T11 and T12(25 and 40% height loss respectively), degenerative changes, disc extrusion at T8-T9 with no high-grade thoracic spinal canal stenosis neural foramina narrowing    Interval History 2024:  Nia Godfrey is here for follow up visit.  She is here to discuss MRI thoracic and lumbar spine which did not show acute compression fracture except chronic wedge compression fracture at T11-T12  She was started on gabapentin and Robaxin as needed for pain, however it did not provide significant relief.  Current pain 7/10    Interval History 2025:  Nia Godfrey is here for follow up visit after bilateral paravertebral block at T10 ultrasound guided back in May 2024,  patient reported 100 % relief  for her upper back pain   In 2024 she was seen by CAMRON Morris N  for mid and low back pain that began after falling out of her rollator while on a cruise on     MRI thoracic spine showed new recent mild compression fracture of T2 and T3 vertebral body in addition to chronic superior endplate compression fracture at T11 and T12.   No new compression fracture of the lumbar spine,  only facet arthropathy  Current pain score: 9/10      Diabetic: Yes    Anticoagulation drugs: None    Allergy To Iodine: No    Currently on  Antibiotic: No    Current Description of Pain Symptoms:  History of Recent Fall or Trauma: No   Onset: Chronic, started on going  Pain Location: Lower back, axial nonradiating  Radiates/associated symptoms: N/A.   Pain is Getting worse over the last 3 months    The pain is described as aching.   Exacerbating factors: Standing, Walking, Night Time, and Morning.   Mitigating factors Sitting.   Symptoms interfere with daily activity, sleeping.   The patient feels like symptoms have been worsening.   Patient denies night fever/night sweats, urinary incontinence, bowel incontinence, significant weight loss, significant motor weakness, and loss of sensations.      Pain score:  Best: 3/10  Worst: 10/10    Current pain medications:  tylenol and ibuprofen   Gabapentin 600 mg 3 times a day - no relief  Robaxin 500 mg 3 times a day  Meloxicam 15 mg, QHS  Current Narcotics/Opioid /benzo Medications:  Opioids- None  Benzodiazepines: No    UDS:  NA    PDMP:  Reviewed and consistent with medication use as prescribed.     Previous Chronic Pain Treatment History:  Physical Therapy/HEP/Physician Lead Exercise Program:    Is patient participating in home exercise program (HEP): Yes> 6 weeks over the last 6 months    Non-interventional Pain Therapy:  []Chiropractor.   []Acupuncture/Dry needle.  []TENS unit.  []Heat/ICE.  []Back Brace.    Medications previously tried:  NSAIDs: Meloxicam (Mobic)  Topical Agent: yes, Lidocaine patch   TCA/SSRI/SNRI: None  Anti-convulsants: None-never tried gabapentin or Lyrica  Muscle Relaxants: Flexeril (Cyclobenzaprine):  Sedation  Opioids- None.    Interventional Pain Procedures:  - 05/17/2024:  bilateral T10 paravertebral block 100% for upper back pain    Previous spine/Relevant joint surgery:  none  Surgical History:   has a past surgical history that includes Hysterectomy; Cholecystectomy; and Injection of anesthetic agent around nerve (Bilateral, 5/17/2024).  Medical History:   has a past medical  "history of Allergy, Asthma, Diabetes mellitus, type 2, Hypertension, and Osteopenia.  Family History:  family history includes Hypertension in her mother.  Allergies:  Alendronate, Atorvastatin, and Codeine   Social History/SUBSTANCE ABUSE HISTORY:  Personal history of substance abuse: No   reports that she has been smoking cigarettes. She has never used smokeless tobacco.  LABS:  CBC  No results found for: "WBC", "HGB", "HCT"  Coagulation Profile   No results found for: "PLT"  No results found for: "PT", "PTT", "INR"  CMP:  BMP  No results found for: "NA", "K", "CL", "CO2", "BUN", "CREATININE", "CALCIUM", "ANIONGAP", "EGFRNORACEVR"  No results found for: "ALT", "AST", "GGT", "ALKPHOS", "BILITOT"  HGBA1C:  No results found for: "LABA1C", "HGBA1C"    ROS:    Review of Systems   GENERAL:  No weight loss, malaise or fevers.  HEENT:   No recent changes in vision or hearing  NECK:  Negative for lumps, no difficulty with swallowing.  RESPIRATORY:  Negative for cough, wheezing or shortness of breath, patient denies any recent URI.  CARDIOVASCULAR:  Negative for chest pain, leg swelling or palpitations.  GI:  Negative for abdominal discomfort, blood in stools or black stools or change in bowel habits.  MUSCULOSKELETAL:  See HPI.  SKIN:  Negative for lesions, rash, and itching.  PSYCH:  No mood disorder or recent psychosocial stressors.   HEMATOLOGY/LYMPHOLOGY:  See the blood thinner sectioned in HPI.  NEURO:  See HPI  All other reviewed and negative other than HPI.    PHYSICAL EXAM:  VITALS: BP (!) 155/72 (BP Location: Right arm, Patient Position: Sitting)   Pulse 78   Ht 5' 2" (1.575 m)   Wt 77.1 kg (170 lb)   BMI 31.09 kg/m²   Body mass index is 31.09 kg/m².  GENERAL: Well appearing, in no acute distress, alert and oriented x3, answers questions appropriately.   PSYCH: Flat affect.  SKIN: Skin color, texture, turgor normal, no rashes or lesions.  HEAD/FACE:  Normocephalic, atraumatic. Cranial nerves grossly " intact.  CV: Regular rate  PULM: No evidence of respiratory difficulty, symmetric chest rise.  GI:  Soft and non-Distended.  BACK/SIJ/HIP:  Lumbar Spine Exam:       Inspection: No erythema, bruising, + kyphotic posture.      Palpation: (+) TTP of lumbar and thoracic paraspinals bilaterally      ROM:  Limited in flexion, extension, lateral bending.       (+++) Facet loading bilaterally      (-) Straight Leg Raise bilaterally      (-) SHANTI bilaterally, Tenderness over the PSIS, Yeoman test  Hip Exam:      Inspection: No gross deformity or apparent leg length discrepancy      Palpation:  + TTP to bilateral greater trochanteric bursas.       ROM:  No limitation or pain in internal rotation, external rotation b/l    GAIT:  Antalgic gait    DIAGNOSTIC STUDIES AND MEDICAL RECORDS REVIEW:  MRI THORACIC SPINE WITHOUT CONTRAST 2024    The incidentally visualized soft tissue structures of the chest and upper abdomen have a normal appearance.     Chronic compression deformities of the superior endplates of T11 and T12, without evidence for an acute compression fracture.  Remaining vertebral body heights are maintained.  There is disc desiccation with disc space narrowing throughout the thoracic spine.  The marrow signal is normal without evidence for a marrow replacement process, infection or tumor.  The spinal cord is normal in signal without cord edema or myelomalacia     At T8-9, there is a inferiorly migrated disc extrusion without central canal stenosis or neural foraminal narrowing.     At T10-11, circumferential disc bulging without central canal stenosis or neural foraminal narrowing.     At T11-12, circumferential disc bulging.  No central canal stenosis or neural foraminal narrowing.  Mild retropulsion of the superior endplate of T12.     Impression:     Chronic compression deformities of T11 and T12.  No acute compression fracture is seen.     Mild degenerative changes of the thoracic spine without central canal  stenosis or neural foraminal narrowing.    MRI lumbar spine  Chronic compression deformities of the superior endplates of T11 and T12.  Remaining vertebral body heights are maintained.  There is disc desiccation throughout the lumbar spine with significant disc space narrowing at L5-S1.  The marrow signal is normal without evidence for a marrow replacement process, infection or tumor.  The conus terminates at T12-L1.     At T12-L1, small focal central disc protrusion without central canal stenosis or neural foraminal narrowing.     At L1-2, circumferential disc bulge without central canal stenosis or neural foraminal narrowing.     At L2-3, circumferential disc bulge with superimposed foraminal disc protrusions, left greater than right.  No significant central canal stenosis or neural foraminal narrowing.     At L3-4, circumferential disc bulge with a right foraminal disc protrusion with ligamentum flavum hypertrophy and facet arthropathy contributing to moderate central canal stenosis with mild right neural foraminal narrowing.     At L4-5, circumferential disc bulge with ligamentum flavum hypertrophy and facet arthropathy contributing to moderate central canal stenosis with minimal bilateral neural foraminal narrowing.     At L5-S1, circumferential disc bulge extending into both neural foramina.  There is osseous spurring in the neural foramina with facet arthropathy.  There is mild central canal stenosis with mild moderate bilateral neural foraminal narrowing.     Impression:     Multilevel degenerative changes of the lumbar spine contributing to central canal stenosis and neural foraminal narrowing as detailed in the above level by level description.     -  MRI thoracic and lumbar spine 07/2024  MRI of the thoracic spine was performed with usual sequences and no contrast.  There is mild accentuation of the thoracic kyphosis.  There are multiple compression fractures.  Chronic superior endplate compression  fractures are seen at T12 and T11.  These do not have edema that would suggest that there recent, and they were both present on the MRI of d 2022.     In the upper thoracic spine there are additional compression fractures at T2 and T3 which appear more recent and have associated vertebral body edema.  The edema is along the superior endplate of T2 and more diffuse at T3.  These are both new compared to the prior MRI.  No retropulsion is seen.     Degenerative disc changes are also present throughout the thoracic spine with narrowing of the T3-4 through T9-10 disc space.  No significant thoracic spinal canal stenosis.  Tiny right paraspinal disc bulges present at T7-8, and at T8-9 there is a small central disc protrusion which is extruded inferiorly behind the T9 vertebra without cord impingement.     No abnormal cord signal is seen to suggest tumor, edema or myelomalacia.  No paraspinous lesions.       -  MRI lumbar spine 2024  T12-L1: Small central protrusion.  No canal or foraminal stenosis.     L1-2: Mild disc bulge.  No canal or foraminal stenosis.     L2-3: Disc bulge.  No canal stenosis.  Mild bilateral foraminal stenosis.     L3-4: Disc bulge.  Bilateral facet arthropathy and ligamentum flavum thickening.  Moderate canal stenosis.  Moderate right and mild left foraminal stenosis.     L4-5: Disc bulge.  Bilateral facet arthropathy and ligamentum flavum thickening.  Mild canal stenosis.  Mild bilateral foraminal stenosis.     L5-S1: Disc bulge.  Bilateral facet arthropathy.  Mild canal stenosis.  Mild bilateral foraminal stenosis.     Impression:     Chronic compression fractures of T11 and T12.  No compression fracture in the lumbar spine.     Degenerative changes as described, similar to 2024.     ASSESSMENT:  Nia Godfrey is a 75 y.o. female with the following diagnoses based on history, exam, and imagin. Chronic bilateral low back pain without sciatica    2. Lumbar facet  arthropathy    3. Degeneration of intervertebral disc of lumbar region with discogenic back pain    4. Chronic pain syndrome     ----------------------------------------------------------------------------  This is a pleasant 75 y.o. female patient with PMH hypertension, hyperlipidemia, diabetes, thoracic compression fracture T11-T12, presenting with  axial low back pain, nonradiating, MRI lumbar spine showed multilevel facet arthropathy  with no significant spinal or neural foramina narrowing,  MRI lumbar spine showed significant L5-S1 degenerative disc disease    Treatment Plan:    Diagnostics/Referrals:  Continue with a home exercise    Medications:    NSAIDs: Ibuprofen (Advil/Motrin)  Topical Agent: Yes  TCA/SSRI/SNRI: None  Anti-convulsants:  DC gabapentin due to lack of efficacy  Muscle Relaxants:  DC Robaxin due to lack of efficacy  Opioids: None    Interventional Therapy: Please schedule for Bilateral LMBB at L4-5 and L5-S1  Sedation:  oral sedation  Clearance to stop Blood thinner: Anticoagulation drugs: None    Regarding the above interventions, the patient has been educated regarding the risks (including bleeding, infection, increased pain, nerve damage, or allergic reaction), benefits, and alternatives. The patient states she understands and is eager to proceed.    Physical Rehabilitation: HEP.    Patient Education: Counseled patient regarding the importance of activity modification, I have stressed the importance of physical activity and a home exercise plan to help with pain and improve health.    Follow-up: RTC after injection    May consider:   via disc procedure by at L5-S1 if she has no relief from lumbar medial branch block.    Eulalio Fajardo MD  Anesthesiologist  Interventional Pain Medicine  02/03/2025    Disclaimer:  This note was prepared using voice recognition system and is likely to have sound alike errors that may have been overlooked even after proof reading.  Please call me with any  questions.

## 2025-02-21 ENCOUNTER — HOSPITAL ENCOUNTER (OUTPATIENT)
Facility: HOSPITAL | Age: 75
Discharge: HOME | End: 2025-02-21
Attending: ANESTHESIOLOGY | Admitting: ANESTHESIOLOGY
Payer: MEDICARE

## 2025-02-21 ENCOUNTER — HOSPITAL ENCOUNTER (OUTPATIENT)
Dept: RADIOLOGY | Facility: HOSPITAL | Age: 75
Discharge: HOME | End: 2025-02-21
Attending: ANESTHESIOLOGY | Admitting: ANESTHESIOLOGY
Payer: MEDICARE

## 2025-02-21 VITALS
HEART RATE: 85 BPM | DIASTOLIC BLOOD PRESSURE: 61 MMHG | TEMPERATURE: 97 F | RESPIRATION RATE: 16 BRPM | SYSTOLIC BLOOD PRESSURE: 126 MMHG | OXYGEN SATURATION: 91 %

## 2025-02-21 DIAGNOSIS — F41.9 ANXIETY: ICD-10-CM

## 2025-02-21 DIAGNOSIS — M47.816 LUMBAR SPONDYLOSIS: Primary | ICD-10-CM

## 2025-02-21 DIAGNOSIS — M47.816 LUMBAR SPONDYLOSIS: ICD-10-CM

## 2025-02-21 DIAGNOSIS — G89.29 CHRONIC PAIN: ICD-10-CM

## 2025-02-21 PROCEDURE — 64493 INJ PARAVERT F JNT L/S 1 LEV: CPT | Mod: 50 | Performed by: ANESTHESIOLOGY

## 2025-02-21 PROCEDURE — 76000 FLUOROSCOPY <1 HR PHYS/QHP: CPT | Mod: TC

## 2025-02-21 PROCEDURE — 63600175 PHARM REV CODE 636 W HCPCS: Performed by: ANESTHESIOLOGY

## 2025-02-21 PROCEDURE — 64494 INJ PARAVERT F JNT L/S 2 LEV: CPT | Mod: 50,KX,, | Performed by: ANESTHESIOLOGY

## 2025-02-21 PROCEDURE — 64494 INJ PARAVERT F JNT L/S 2 LEV: CPT | Mod: 50 | Performed by: ANESTHESIOLOGY

## 2025-02-21 PROCEDURE — 64493 INJ PARAVERT F JNT L/S 1 LEV: CPT | Mod: 50,KX,, | Performed by: ANESTHESIOLOGY

## 2025-02-21 PROCEDURE — 25000003 PHARM REV CODE 250: Performed by: ANESTHESIOLOGY

## 2025-02-21 RX ORDER — LIDOCAINE HYDROCHLORIDE 20 MG/ML
INJECTION, SOLUTION INFILTRATION; PERINEURAL
Status: DISCONTINUED | OUTPATIENT
Start: 2025-02-21 | End: 2025-02-21 | Stop reason: HOSPADM

## 2025-02-21 RX ORDER — BUPIVACAINE HYDROCHLORIDE 5 MG/ML
INJECTION, SOLUTION EPIDURAL; INTRACAUDAL
Status: DISCONTINUED | OUTPATIENT
Start: 2025-02-21 | End: 2025-02-21 | Stop reason: HOSPADM

## 2025-02-21 RX ORDER — ALPRAZOLAM 0.25 MG/1
0.5 TABLET, ORALLY DISINTEGRATING ORAL
Status: DISCONTINUED | OUTPATIENT
Start: 2025-02-21 | End: 2025-03-03 | Stop reason: HOSPADM

## 2025-02-21 RX ADMIN — ALPRAZOLAM 0.5 MG: 0.25 TABLET, ORALLY DISINTEGRATING ORAL at 12:02

## 2025-02-21 NOTE — OP NOTE
Diagnostic Lumbar Medial Branch Block Under Fluoroscopy    The procedure, risks, benefits, and options were discussed with the patient. There are no contraindications to the procedure. The patent expressed understanding and agreed to the procedure. Informed written consent was obtained prior to the start of the procedure and can be found in the patient's chart.    PATIENT NAME: Nia Godfrey   MRN: [id number]     DATE OF PROCEDURE: 02/21/2025                                           PROCEDURE:  Diagnostic Bilateral L3, L4, and L5 Lumbar Medial Branch Block under Fluoroscopy    PRE-OP DIAGNOSIS: Lumbar spondylosis [M47.816] Lumbar spondylosis [M47.816]    POST-OP DIAGNOSIS: Same    PHYSICIAN:     MEDICATIONS INJECTED:  Bupivicaine 0.25%    LOCAL ANESTHETIC INJECTED:   Xylocaine 2%    SEDATION: oral    ESTIMATED BLOOD LOSS:  None    COMPLICATIONS:  None.    INTERVAL HISTORY: Patient has clinical and imaging findings suggestive of facet mediated pain.    TECHNIQUE: Time-out was performed to identify the patient and procedure to be performed. With the patient laying in a prone position, the surgical area was prepped and draped in the usual sterile fashion using ChloraPrep and fenestrated drape. The levels were determined under fluoroscopic guidance. Skin anesthesia was achieved by injecting Lidocaine 2% over the injection sites. A 22 gauge, 3.5 inch needle was introduced into the medial branch nerves at the junctions of the superior articular process and the transverse processes of the targeted sites using AP, lateral and/or contralateral oblique fluoroscopic imaging. After negative aspiration for blood or CSF was confirmed, 0.5 mL of the anesthetic listed above was then slowly injected at each site. The needles were removed and bleeding was nil. A sterile dressing was applied. No specimens collected. The patient tolerated the procedure well.      The patient was monitored after the procedure in the recovery area.  They were given post-procedure and discharge instructions to follow at home. The patient was discharged in a stable condition.    Eulalio Fajardo MD

## 2025-02-21 NOTE — DISCHARGE INSTRUCTIONS
DIET: You may resume your normal diet today.    BATHING: You may resume your normal bathing.          You may shower, no hot water directly on site for 24 hours.    DRESSING: You may remove your bandage today.    ACTIVITY LEVEL: You may resume your normal activities 24 hours after your  procedure.    If you have received sedation or an anesthetic, you may feel sleepy  for several hours. Rest until you are more awake. Gradually resume your normal activities tomorrow.    If you have received sedation or an anesthetic, do not drive or operate heavy machinery for at least 24 hours.    MEDICATION: You may resume your normal medications today.    You will receive instructions for any pain prescriptions. Pain medications should be taken only as directed.    SPECIAL INSTRUCTIONS: No heat to the injection site for 24 hours including: bath or shower, heating pad, moist heat, hot tubs.    Use ice pack to injection site for any pain or discomfort. Apply ice pack to 20 minutes then remove for 20 minutes before re-applying to site.    WHEN TO CALL DOCTOR: Redness or swelling around injection site    Fever of 101F    Drainage (pus) from the injection site    For any continuous bleeding (some dried blood over the incision is normal).    FOLLOW UP: Follow up phone call will be made by office.        Diet on Discharge: Same as before.  Activity: as per instruction sheet.  Discharge to: Home with a responsible adult.  Follow up: 2-4 weeks        Please call my office or pager at [phone] if experienced any weakness or loss of sensation, fever > 101.5, pain uncontrolled with oral medications, persistent nausea/vomiting/or diarrhea, redness or drainage from the incisions, or any other worrisome concerns. If physician on call was not reached or could not communicate with our office for any reason please go to the nearest emergency department.      Eulalio Fajardo

## 2025-02-21 NOTE — DISCHARGE SUMMARY
Discharge Note  Short Stay    Admit Date: 2/21/2025    Attending Physician: Eulalio Fajardo    Discharge Physician: Eulalio Fajardo    Discharge Date: 2/21/2025 12:35 PM    Procedure(s) (LRB):  LUMBAR MEDIAL BRANCH NERVE BLOCK (L4-5,L5-S1) (ORAL) (Bilateral)    Final Diagnosis: Lumbar spondylosis [M47.816]    Disposition: Home or self care    Patient Instructions:   Current Discharge Medication List        CONTINUE these medications which have NOT CHANGED    Details   amLODIPine (NORVASC) 10 MG tablet Take 5 mg by mouth once daily.      calcitonin, salmon, (FORTICAL) 200 unit/actuation nasal spray 1 spray by Nasal route once daily.  Qty: 1 mL, Refills: 0    Associated Diagnoses: Compression fracture of T12 vertebra, sequela      gabapentin (NEURONTIN) 300 MG capsule Take 1 capsule (300 mg total) by mouth 3 (three) times daily.  Qty: 90 capsule, Refills: 11    Associated Diagnoses: Chronic bilateral thoracic back pain      JANUVIA 100 mg Tab Take 100 mg by mouth.      meloxicam (MOBIC) 15 MG tablet Take 1 tablet (15 mg total) by mouth once daily.  Qty: 14 tablet, Refills: 0    Associated Diagnoses: Compression fracture of T12 vertebra, sequela; Lumbar radiculopathy      montelukast (SINGULAIR) 10 mg tablet Take 10 mg by mouth.      methocarbamoL (ROBAXIN) 500 MG Tab Take 1 tablet (500 mg total) by mouth 3 (three) times daily as needed (muscle spasms).  Qty: 90 tablet, Refills: 0    Associated Diagnoses: Chronic bilateral thoracic back pain; Chronic bilateral low back pain without sciatica             Discharge Diagnosis: Lumbar spondylosis [M47.816]  Condition on Discharge: Stable with no complications to procedure   Diet on Discharge: Same as before.  Activity: as per instruction sheet.  Discharge to: Home with a responsible adult.  Follow up: 2-4 weeks       Please call my office or pager at [phone] if experienced any weakness or loss of sensation, fever > 101.5, pain uncontrolled with oral medications, persistent  nausea/vomiting/or diarrhea, redness or drainage from the incisions, or any other worrisome concerns. If physician on call was not reached or could not communicate with our office for any reason please go to the nearest emergency department.     Eulalio Fajardo  02/21/2025

## 2025-02-24 ENCOUNTER — TELEPHONE (OUTPATIENT)
Dept: PAIN MEDICINE | Facility: CLINIC | Age: 75
End: 2025-02-24
Payer: MEDICARE

## 2025-02-24 NOTE — TELEPHONE ENCOUNTER
Eulalio Fajardo MD [facility] Staff  Caller: Unspecified (Today, 12:50 PM)  Please schedule her for office visit tomorrow to discuss Intracept

## 2025-02-24 NOTE — TELEPHONE ENCOUNTER
Medial Branch Follow Up Phone Call    Procedure: Bilateral lumbar medial branch diagnostic blocks  Procedure Date: 2/21/25    The patient was contacted via telephone after the diagnostic block to assess post-operative pain scores and function.  The following was reported.    The patient reports less than 80% improvement in pain/function in the first few hours after the procedure.      Pre procedure pain: 10/10  Post procedure pain 7/10  Current Pain: 7/10.       May consider:   via disc procedure by at L5-S1 if she has no relief from lumbar medial branch block.   (patient is [employer])  Will send note to Dr. Fajardo for further planning.

## 2025-02-25 ENCOUNTER — OFFICE VISIT (OUTPATIENT)
Dept: PAIN MEDICINE | Facility: CLINIC | Age: 75
End: 2025-02-25
Payer: MEDICARE

## 2025-02-25 ENCOUNTER — TELEPHONE (OUTPATIENT)
Dept: PAIN MEDICINE | Facility: CLINIC | Age: 75
End: 2025-02-25

## 2025-02-25 VITALS
OXYGEN SATURATION: 88 % | WEIGHT: 169 LBS | SYSTOLIC BLOOD PRESSURE: 165 MMHG | BODY MASS INDEX: 31.1 KG/M2 | DIASTOLIC BLOOD PRESSURE: 70 MMHG | HEART RATE: 78 BPM | HEIGHT: 62 IN

## 2025-02-25 DIAGNOSIS — G89.29 CHRONIC BILATERAL LOW BACK PAIN WITHOUT SCIATICA: Primary | ICD-10-CM

## 2025-02-25 DIAGNOSIS — G89.4 CHRONIC PAIN SYNDROME: ICD-10-CM

## 2025-02-25 DIAGNOSIS — M51.360 DEGENERATION OF INTERVERTEBRAL DISC OF LUMBAR REGION WITH DISCOGENIC BACK PAIN: Primary | ICD-10-CM

## 2025-02-25 DIAGNOSIS — M54.50 CHRONIC BILATERAL LOW BACK PAIN WITHOUT SCIATICA: Primary | ICD-10-CM

## 2025-02-25 DIAGNOSIS — M51.360 DEGENERATION OF INTERVERTEBRAL DISC OF LUMBAR REGION WITH DISCOGENIC BACK PAIN: ICD-10-CM

## 2025-02-25 PROCEDURE — 4010F ACE/ARB THERAPY RXD/TAKEN: CPT | Mod: CPTII,S$GLB,, | Performed by: ANESTHESIOLOGY

## 2025-02-25 PROCEDURE — 3008F BODY MASS INDEX DOCD: CPT | Mod: CPTII,S$GLB,, | Performed by: ANESTHESIOLOGY

## 2025-02-25 PROCEDURE — 3077F SYST BP >= 140 MM HG: CPT | Mod: CPTII,S$GLB,, | Performed by: ANESTHESIOLOGY

## 2025-02-25 PROCEDURE — 99999 PR PBB SHADOW E&M-EST. PATIENT-LVL III: CPT | Mod: PBBFAC,,, | Performed by: ANESTHESIOLOGY

## 2025-02-25 PROCEDURE — 1160F RVW MEDS BY RX/DR IN RCRD: CPT | Mod: CPTII,S$GLB,, | Performed by: ANESTHESIOLOGY

## 2025-02-25 PROCEDURE — 1159F MED LIST DOCD IN RCRD: CPT | Mod: CPTII,S$GLB,, | Performed by: ANESTHESIOLOGY

## 2025-02-25 PROCEDURE — 3078F DIAST BP <80 MM HG: CPT | Mod: CPTII,S$GLB,, | Performed by: ANESTHESIOLOGY

## 2025-02-25 PROCEDURE — 99214 OFFICE O/P EST MOD 30 MIN: CPT | Mod: S$GLB,,, | Performed by: ANESTHESIOLOGY

## 2025-02-25 PROCEDURE — G2211 COMPLEX E/M VISIT ADD ON: HCPCS | Mod: S$GLB,,, | Performed by: ANESTHESIOLOGY

## 2025-02-25 PROCEDURE — 1125F AMNT PAIN NOTED PAIN PRSNT: CPT | Mod: CPTII,S$GLB,, | Performed by: ANESTHESIOLOGY

## 2025-02-25 NOTE — PROGRESS NOTES
[facility] EST Patient Evaluation    Nia Godfrey  : 1950  Date: 2025     CHIEF COMPLAINT:  No chief complaint on file.  Referring Physician: No ref. provider found  Primary Care Physician: Cristiana Carrillo NP    HPI:  This is a 75 y.o. female with a chief complaint of No chief complaint on file.  . The patient has Past medical history/Past surgical history of hypertension, hyperlipidemia, diabetes, thoracic compression fracture T11-T12  Patient was evaluated and referred by PCP for low back pain.    Previously, she was evaluated by orthopedic provider in , patient had a recommendation to obtain MRI thoracic spine that showed remote compression fracture at T11 and T12(25 and 40% height loss respectively), degenerative changes, disc extrusion at T8-T9 with no high-grade thoracic spinal canal stenosis neural foramina narrowing    Interval History 2024:  Nia Godfrey is here for follow up visit.  She is here to discuss MRI thoracic and lumbar spine which did not show acute compression fracture except chronic wedge compression fracture at T11-T12  She was started on gabapentin and Robaxin as needed for pain, however it did not provide significant relief.  Current pain 7/10    Interval History 2025:  Nia Godfrey is here for follow up visit after bilateral paravertebral block at T10 ultrasound guided back in May 2024,  patient reported 100 % relief  for her upper back pain   In 2024 she was seen by CAMRON Morris N  for mid and low back pain that began after falling out of her rollator while on a cruise on     MRI thoracic spine showed new recent mild compression fracture of T2 and T3 vertebral body in addition to chronic superior endplate compression fracture at T11 and T12.   No new compression fracture of the lumbar spine,  only facet arthropathy  Current pain score: 9/10  Interval History 2025:  Nia Godfrey is here for  procedure follow up visit after Bilateral lumbar medial branch block at L4-5 and L5-S1 which provided her with no relief.  Current pain score: ***/10        Diabetic: Yes    Anticoagulation drugs: None    Allergy To Iodine: No    Currently on Antibiotic: No    Current Description of Pain Symptoms:  History of Recent Fall or Trauma: No   Onset: Chronic, started on going  Pain Location: Lower back, axial nonradiating  Radiates/associated symptoms: N/A.   Pain is Getting worse over the last 3 months    The pain is described as aching.   Exacerbating factors: Standing, Walking, Night Time, and Morning.   Mitigating factors Sitting.   Symptoms interfere with daily activity, sleeping.   The patient feels like symptoms have been worsening.   Patient denies night fever/night sweats, urinary incontinence, bowel incontinence, significant weight loss, significant motor weakness, and loss of sensations.      Pain score:  Best: 3/10  Worst: 10/10    Current pain medications:  tylenol and ibuprofen   Gabapentin 600 mg 3 times a day - no relief  Robaxin 500 mg 3 times a day  Meloxicam 15 mg, QHS  Current Narcotics/Opioid /benzo Medications:  Opioids- None  Benzodiazepines: No    UDS:  NA    PDMP:  Reviewed and consistent with medication use as prescribed.     Previous Chronic Pain Treatment History:  Physical Therapy/HEP/Physician Lead Exercise Program:    Is patient participating in home exercise program (HEP): Yes> 6 weeks over the last 6 months    Non-interventional Pain Therapy:  []Chiropractor.   []Acupuncture/Dry needle.  []TENS unit.  []Heat/ICE.  []Back Brace.    Medications previously tried:  NSAIDs: Meloxicam (Mobic)  Topical Agent: yes, Lidocaine patch   TCA/SSRI/SNRI: None  Anti-convulsants: None-never tried gabapentin or Lyrica  Muscle Relaxants: Flexeril (Cyclobenzaprine):  Sedation  Opioids- None.    Interventional Pain Procedures:  - 05/17/2024:  bilateral T10 paravertebral block 100% for upper back  "pain  -  Previous spine/Relevant joint surgery:  none  Surgical History:   has a past surgical history that includes Hysterectomy; Cholecystectomy; Injection of anesthetic agent around nerve (Bilateral, 5/17/2024); and Injection of anesthetic agent around medial branch nerves innervating lumbar facet joint (Bilateral, 2/21/2025).  Medical History:   has a past medical history of Allergy, Asthma, Diabetes mellitus, type 2, Hypertension, and Osteopenia.  Family History:  family history includes Hypertension in her mother.  Allergies:  Alendronate, Atorvastatin, and Codeine   Social History/SUBSTANCE ABUSE HISTORY:  Personal history of substance abuse: No   reports that she has been smoking cigarettes. She has never used smokeless tobacco.  LABS:  CBC  No results found for: "WBC", "HGB", "HCT"  Coagulation Profile   No results found for: "PLT"  No results found for: "PT", "PTT", "INR"  CMP:  BMP  No results found for: "NA", "K", "CL", "CO2", "BUN", "CREATININE", "CALCIUM", "ANIONGAP", "EGFRNORACEVR"  No results found for: "ALT", "AST", "GGT", "ALKPHOS", "BILITOT"  HGBA1C:  No results found for: "LABA1C", "HGBA1C"    ROS:    Review of Systems   GENERAL:  No weight loss, malaise or fevers.  HEENT:   No recent changes in vision or hearing  NECK:  Negative for lumps, no difficulty with swallowing.  RESPIRATORY:  Negative for cough, wheezing or shortness of breath, patient denies any recent URI.  CARDIOVASCULAR:  Negative for chest pain, leg swelling or palpitations.  GI:  Negative for abdominal discomfort, blood in stools or black stools or change in bowel habits.  MUSCULOSKELETAL:  See HPI.  SKIN:  Negative for lesions, rash, and itching.  PSYCH:  No mood disorder or recent psychosocial stressors.   HEMATOLOGY/LYMPHOLOGY:  See the blood thinner sectioned in HPI.  NEURO:  See HPI  All other reviewed and negative other than HPI.    PHYSICAL EXAM:  VITALS: There were no vitals taken for this visit.  There is no height or " weight on file to calculate BMI.  GENERAL: Well appearing, in no acute distress, alert and oriented x3, answers questions appropriately.   PSYCH: Flat affect.  SKIN: Skin color, texture, turgor normal, no rashes or lesions.  HEAD/FACE:  Normocephalic, atraumatic. Cranial nerves grossly intact.  CV: Regular rate  PULM: No evidence of respiratory difficulty, symmetric chest rise.  GI:  Soft and non-Distended.  BACK/SIJ/HIP:  Lumbar Spine Exam:       Inspection: No erythema, bruising, + kyphotic posture.      Palpation: (+) TTP of lumbar and thoracic paraspinals bilaterally      ROM:  Limited in flexion, extension, lateral bending.       (+++) Facet loading bilaterally      (-) Straight Leg Raise bilaterally      (-) SHANTI bilaterally, Tenderness over the PSIS, Yeoman test  Hip Exam:      Inspection: No gross deformity or apparent leg length discrepancy      Palpation:  + TTP to bilateral greater trochanteric bursas.       ROM:  No limitation or pain in internal rotation, external rotation b/l    GAIT:  Antalgic gait    DIAGNOSTIC STUDIES AND MEDICAL RECORDS REVIEW:  MRI THORACIC SPINE WITHOUT CONTRAST 2024    The incidentally visualized soft tissue structures of the chest and upper abdomen have a normal appearance.     Chronic compression deformities of the superior endplates of T11 and T12, without evidence for an acute compression fracture.  Remaining vertebral body heights are maintained.  There is disc desiccation with disc space narrowing throughout the thoracic spine.  The marrow signal is normal without evidence for a marrow replacement process, infection or tumor.  The spinal cord is normal in signal without cord edema or myelomalacia     At T8-9, there is a inferiorly migrated disc extrusion without central canal stenosis or neural foraminal narrowing.     At T10-11, circumferential disc bulging without central canal stenosis or neural foraminal narrowing.     At T11-12, circumferential disc bulging.  No  central canal stenosis or neural foraminal narrowing.  Mild retropulsion of the superior endplate of T12.     Impression:     Chronic compression deformities of T11 and T12.  No acute compression fracture is seen.     Mild degenerative changes of the thoracic spine without central canal stenosis or neural foraminal narrowing.    MRI lumbar spine  Chronic compression deformities of the superior endplates of T11 and T12.  Remaining vertebral body heights are maintained.  There is disc desiccation throughout the lumbar spine with significant disc space narrowing at L5-S1.  The marrow signal is normal without evidence for a marrow replacement process, infection or tumor.  The conus terminates at T12-L1.     At T12-L1, small focal central disc protrusion without central canal stenosis or neural foraminal narrowing.     At L1-2, circumferential disc bulge without central canal stenosis or neural foraminal narrowing.     At L2-3, circumferential disc bulge with superimposed foraminal disc protrusions, left greater than right.  No significant central canal stenosis or neural foraminal narrowing.     At L3-4, circumferential disc bulge with a right foraminal disc protrusion with ligamentum flavum hypertrophy and facet arthropathy contributing to moderate central canal stenosis with mild right neural foraminal narrowing.     At L4-5, circumferential disc bulge with ligamentum flavum hypertrophy and facet arthropathy contributing to moderate central canal stenosis with minimal bilateral neural foraminal narrowing.     At L5-S1, circumferential disc bulge extending into both neural foramina.  There is osseous spurring in the neural foramina with facet arthropathy.  There is mild central canal stenosis with mild moderate bilateral neural foraminal narrowing.     Impression:     Multilevel degenerative changes of the lumbar spine contributing to central canal stenosis and neural foraminal narrowing as detailed in the above level  by level description.     -  MRI thoracic and lumbar spine 07/2024  MRI of the thoracic spine was performed with usual sequences and no contrast.  There is mild accentuation of the thoracic kyphosis.  There are multiple compression fractures.  Chronic superior endplate compression fractures are seen at T12 and T11.  These do not have edema that would suggest that there recent, and they were both present on the MRI of d 11/16/2022.     In the upper thoracic spine there are additional compression fractures at T2 and T3 which appear more recent and have associated vertebral body edema.  The edema is along the superior endplate of T2 and more diffuse at T3.  These are both new compared to the prior MRI.  No retropulsion is seen.     Degenerative disc changes are also present throughout the thoracic spine with narrowing of the T3-4 through T9-10 disc space.  No significant thoracic spinal canal stenosis.  Tiny right paraspinal disc bulges present at T7-8, and at T8-9 there is a small central disc protrusion which is extruded inferiorly behind the T9 vertebra without cord impingement.     No abnormal cord signal is seen to suggest tumor, edema or myelomalacia.  No paraspinous lesions.       -  MRI lumbar spine 07/2024  T12-L1: Small central protrusion.  No canal or foraminal stenosis.     L1-2: Mild disc bulge.  No canal or foraminal stenosis.     L2-3: Disc bulge.  No canal stenosis.  Mild bilateral foraminal stenosis.     L3-4: Disc bulge.  Bilateral facet arthropathy and ligamentum flavum thickening.  Moderate canal stenosis.  Moderate right and mild left foraminal stenosis.     L4-5: Disc bulge.  Bilateral facet arthropathy and ligamentum flavum thickening.  Mild canal stenosis.  Mild bilateral foraminal stenosis.     L5-S1: Disc bulge.  Bilateral facet arthropathy.  Mild canal stenosis.  Mild bilateral foraminal stenosis.     Impression:     Chronic compression fractures of T11 and T12.  No compression fracture in  the lumbar spine.     Degenerative changes as described, similar to 04/16/2024.           ASSESSMENT:  Nia Godfrey is a 75 y.o. female with the following diagnoses based on history, exam, and imaging:  There are no diagnoses linked to this encounter.     ----------------------------------------------------------------------------  This is a pleasant 75 y.o. female patient with PMH hypertension, hyperlipidemia, diabetes, thoracic compression fracture T11-T12, presenting with  axial low back pain, nonradiating, MRI lumbar spine showed multilevel facet arthropathy  with no significant spinal or neural foramina narrowing,  MRI lumbar spine showed significant L5-S1 degenerative disc disease,  patient has failed    Vertebrogenic Low back pain  -This patient is a good candidate for Intracept (Basivertebral nerve ablation) for the following reasons:   1) She has chronic low back pain that is primarily axial for > 6 months  2) She has failed conservative treatments for > 6 months  3) Has clear Modic changes on MRI at the L5-S1 levels that are consistent with the patient's pain pattern  -For further detail the patient has failed the following therapies: Physical Therapy, Home exercise program, Heat/Ice, Tylenol, NSAIDs (ibuprofen, naproxen, meloxicam, ), Muscle relaxers (cyclobenzaprine, methocarbamol), anticonvulsants including gabapentin and lumbar medial branch block  -The patient has significant impact in ADLs including walking, standing, getting dressed, bending, lifting, etc...  -Their pain score is consistently >9/10  -My review of the patient's history and physical exam are consistent with a diagnosis of vertebrogenic back pain  -The patient underwent psychological screening by me and deemed appropriate to proceed with the procedure.  -SILVESTRE score performed on 2/25/2025 shows ***% disability  -There are 2 level 1 RCTs with longitudinal follow up (SMART with 5-year and Intracept with 3-year) that demonstrate that  this procedure meets the necessary criteria for approval.  -Lastly, there are very few options to treat vertebrogenic back pain and this procedure has been shown to be safe, effective, and durable.  Without this treatment you are committing the patient to undo hardship and pain without any viable alternative.  This procedure is medically necessary.    Treatment Plan:    Diagnostics/Referrals:  Continue with a home exercise    Medications:    NSAIDs: Ibuprofen (Advil/Motrin)  Topical Agent: Yes  TCA/SSRI/SNRI: None  Anti-convulsants:  DC gabapentin due to lack of efficacy  Muscle Relaxants:  DC Robaxin due to lack of efficacy  Opioids: None    Interventional Therapy: Please schedule for VIA disc at L5-S1  Sedation:  IV sedation, 2 gram ancef  Clearance to stop Blood thinner: Anticoagulation drugs: None    Regarding the above interventions, the patient has been educated regarding the risks (including bleeding, infection, increased pain, nerve damage, or allergic reaction), benefits, and alternatives. The patient states she understands and is eager to proceed.    Physical Rehabilitation: HEP.    Patient Education: Counseled patient regarding the importance of activity modification, I have stressed the importance of physical activity and a home exercise plan to help with pain and improve health.    Follow-up: RTC after injection    May consider:       Eulalio Fajardo MD  Anesthesiologist  Interventional Pain Medicine  02/25/2025    Disclaimer:  This note was prepared using voice recognition system and is likely to have sound alike errors that may have been overlooked even after proof reading.  Please call me with any questions.

## 2025-02-25 NOTE — PROGRESS NOTES
[facility] EST Patient Evaluation    Nia Godfrey  : 1950  Date: 2025     CHIEF COMPLAINT:  Low-back Pain and Follow-up (LUMBAR MEDIAL BRANCH NERVE BLOCK (L3,4,5) (ORAL))  Referring Physician: Self, Aaareferral  Primary Care Physician: Cristiana Carrillo NP    HPI:  This is a 75 y.o. female with a chief complaint of Low-back Pain and Follow-up (LUMBAR MEDIAL BRANCH NERVE BLOCK (L3,4,5) (ORAL))  . The patient has Past medical history/Past surgical history of hypertension, hyperlipidemia, diabetes, thoracic compression fracture T11-T12  Patient was evaluated and referred by PCP for low back pain.    Previously, she was evaluated by orthopedic provider in , patient had a recommendation to obtain MRI thoracic spine that showed remote compression fracture at T11 and T12(25 and 40% height loss respectively), degenerative changes, disc extrusion at T8-T9 with no high-grade thoracic spinal canal stenosis neural foramina narrowing    Interval History 2024:  Nia Godfrey is here for follow up visit.  She is here to discuss MRI thoracic and lumbar spine which did not show acute compression fracture except chronic wedge compression fracture at T11-T12  She was started on gabapentin and Robaxin as needed for pain, however it did not provide significant relief.  Current pain 7/10    Interval History 2025:  Nia Godfrey is here for follow up visit after bilateral paravertebral block at T10 ultrasound guided back in May 2024,  patient reported 100 % relief  for her upper back pain   In 2024 she was seen by CAMRON Morris N  for mid and low back pain that began after falling out of her rollator while on a cruise on     MRI thoracic spine showed new recent mild compression fracture of T2 and T3 vertebral body in addition to chronic superior endplate compression fracture at T11 and T12.   No new compression fracture of the lumbar spine,  only facet  arthropathy  Current pain score: 9/10    Interval History 02/25/2025:  Nia Godfrey is here for procedure follow up visit after Bilateral lumbar medial branch block at L4-5 and L5-S1 which provided her with less than 80% relief for 1 day.   Current pain score: 5/10    Diabetic: Yes    Anticoagulation drugs: None    Allergy To Iodine: No    Currently on Antibiotic: No    Current Description of Pain Symptoms:  History of Recent Fall or Trauma: No   Onset: Chronic, started on going  Pain Location: Lower back, axial nonradiating  Radiates/associated symptoms: N/A.   Pain is Getting worse over the last 3 months    The pain is described as aching.   Exacerbating factors: Standing, Walking, Night Time, and Morning.   Mitigating factors Sitting.   Symptoms interfere with daily activity, sleeping.   The patient feels like symptoms have been worsening.   Patient denies night fever/night sweats, urinary incontinence, bowel incontinence, significant weight loss, significant motor weakness, and loss of sensations.    Pain score:  Best: 3/10  Worst: 10/10    Current pain medications:  tylenol  Gabapentin 600 mg 3 times a day -  not taking due to lack of efficacy  Robaxin 500 mg 3 times a day-  not taking due to lack of efficacy  Meloxicam 15 mg, QHS  Current Narcotics/Opioid /benzo Medications:  Opioids- None  Benzodiazepines: No    UDS:  NA    PDMP:  Reviewed and consistent with medication use as prescribed.     Previous Chronic Pain Treatment History:  Physical Therapy/HEP/Physician Lead Exercise Program:    Is patient participating in home exercise program (HEP): Yes> 6 weeks over the last 6 months    Non-interventional Pain Therapy:  []Chiropractor.   []Acupuncture/Dry needle.  []TENS unit.  []Heat/ICE.  []Back Brace.    Medications previously tried:  NSAIDs: Meloxicam (Mobic)  Topical Agent: yes, Lidocaine patch   TCA/SSRI/SNRI: None  Anti-convulsants: None-never tried gabapentin or Lyrica  Muscle Relaxants: Flexeril  "(Cyclobenzaprine):  Sedation  Opioids- None.    Interventional Pain Procedures:  - 05/17/2024:  bilateral T10 paravertebral block 100% for upper back pain  - 02/21/2025: LUMBAR MEDIAL BRANCH NERVE BLOCK (L3,4,5) (ORAL)- less than 80% relief   Previous spine/Relevant joint surgery:  none  Surgical History:   has a past surgical history that includes Hysterectomy; Cholecystectomy; Injection of anesthetic agent around nerve (Bilateral, 5/17/2024); and Injection of anesthetic agent around medial branch nerves innervating lumbar facet joint (Bilateral, 2/21/2025).  Medical History:   has a past medical history of Allergy, Asthma, Diabetes mellitus, type 2, Hypertension, and Osteopenia.  Family History:  family history includes Hypertension in her mother.  Allergies:  Alendronate, Atorvastatin, and Codeine   Social History/SUBSTANCE ABUSE HISTORY:  Personal history of substance abuse: No   reports that she has been smoking cigarettes. She has never used smokeless tobacco.  LABS:  CBC  No results found for: "WBC", "HGB", "HCT"  Coagulation Profile   No results found for: "PLT"  No results found for: "PT", "PTT", "INR"  CMP:  BMP  No results found for: "NA", "K", "CL", "CO2", "BUN", "CREATININE", "CALCIUM", "ANIONGAP", "EGFRNORACEVR"  No results found for: "ALT", "AST", "GGT", "ALKPHOS", "BILITOT"  HGBA1C:  No results found for: "LABA1C", "HGBA1C"    ROS:    Review of Systems   GENERAL:  No weight loss, malaise or fevers.  HEENT:   No recent changes in vision or hearing  NECK:  Negative for lumps, no difficulty with swallowing.  RESPIRATORY:  Negative for cough, wheezing or shortness of breath, patient denies any recent URI.  CARDIOVASCULAR:  Negative for chest pain, leg swelling or palpitations.  GI:  Negative for abdominal discomfort, blood in stools or black stools or change in bowel habits.  MUSCULOSKELETAL:  See HPI.  SKIN:  Negative for lesions, rash, and itching.  PSYCH:  No mood disorder or recent psychosocial " "stressors.   HEMATOLOGY/LYMPHOLOGY:  See the blood thinner sectioned in HPI.  NEURO:  See HPI  All other reviewed and negative other than HPI.    PHYSICAL EXAM:  VITALS: BP (!) 165/70 (BP Location: Left arm, Patient Position: Sitting)   Pulse 78   Ht 5' 2" (1.575 m)   Wt 76.7 kg (169 lb)   SpO2 (!) 88%   BMI 30.91 kg/m²   Body mass index is 30.91 kg/m².  GENERAL: Well appearing, in no acute distress, alert and oriented x3, answers questions appropriately.   PSYCH: Flat affect.  SKIN: Skin color, texture, turgor normal, no rashes or lesions.  HEAD/FACE:  Normocephalic, atraumatic. Cranial nerves grossly intact.  CV: Regular rate  PULM: No evidence of respiratory difficulty, symmetric chest rise.  GI:  Soft and non-Distended.  BACK/SIJ/HIP:  Lumbar Spine Exam:       Inspection: No erythema, bruising, + kyphotic posture.      Palpation: (+) TTP of lumbar and thoracic paraspinals bilaterally      ROM:  Limited in flexion, extension, lateral bending.       (+++) Facet loading bilaterally      (-) Straight Leg Raise bilaterally      (-) SHANTI bilaterally, Tenderness over the PSIS, Yeoman test  Hip Exam:      Inspection: No gross deformity or apparent leg length discrepancy      Palpation:  + TTP to bilateral greater trochanteric bursas.       ROM:  No limitation or pain in internal rotation, external rotation b/l    GAIT:  Antalgic gait    DIAGNOSTIC STUDIES AND MEDICAL RECORDS REVIEW:  MRI THORACIC SPINE WITHOUT CONTRAST 2024  The incidentally visualized soft tissue structures of the chest and upper abdomen have a normal appearance.     Chronic compression deformities of the superior endplates of T11 and T12, without evidence for an acute compression fracture.  Remaining vertebral body heights are maintained.  There is disc desiccation with disc space narrowing throughout the thoracic spine.  The marrow signal is normal without evidence for a marrow replacement process, infection or tumor.  The spinal cord is normal " in signal without cord edema or myelomalacia     At T8-9, there is a inferiorly migrated disc extrusion without central canal stenosis or neural foraminal narrowing.     At T10-11, circumferential disc bulging without central canal stenosis or neural foraminal narrowing.     At T11-12, circumferential disc bulging.  No central canal stenosis or neural foraminal narrowing.  Mild retropulsion of the superior endplate of T12.     Impression:     Chronic compression deformities of T11 and T12.  No acute compression fracture is seen.     Mild degenerative changes of the thoracic spine without central canal stenosis or neural foraminal narrowing.    MRI lumbar spine  Chronic compression deformities of the superior endplates of T11 and T12.  Remaining vertebral body heights are maintained.  There is disc desiccation throughout the lumbar spine with significant disc space narrowing at L5-S1.  The marrow signal is normal without evidence for a marrow replacement process, infection or tumor.  The conus terminates at T12-L1.     At T12-L1, small focal central disc protrusion without central canal stenosis or neural foraminal narrowing.     At L1-2, circumferential disc bulge without central canal stenosis or neural foraminal narrowing.     At L2-3, circumferential disc bulge with superimposed foraminal disc protrusions, left greater than right.  No significant central canal stenosis or neural foraminal narrowing.     At L3-4, circumferential disc bulge with a right foraminal disc protrusion with ligamentum flavum hypertrophy and facet arthropathy contributing to moderate central canal stenosis with mild right neural foraminal narrowing.     At L4-5, circumferential disc bulge with ligamentum flavum hypertrophy and facet arthropathy contributing to moderate central canal stenosis with minimal bilateral neural foraminal narrowing.     At L5-S1, circumferential disc bulge extending into both neural foramina.  There is osseous  spurring in the neural foramina with facet arthropathy.  There is mild central canal stenosis with mild moderate bilateral neural foraminal narrowing.     Impression:     Multilevel degenerative changes of the lumbar spine contributing to central canal stenosis and neural foraminal narrowing as detailed in the above level by level description.     -  MRI thoracic and lumbar spine 07/2024  MRI of the thoracic spine was performed with usual sequences and no contrast.  There is mild accentuation of the thoracic kyphosis.  There are multiple compression fractures.  Chronic superior endplate compression fractures are seen at T12 and T11.  These do not have edema that would suggest that there recent, and they were both present on the MRI of d 11/16/2022.     In the upper thoracic spine there are additional compression fractures at T2 and T3 which appear more recent and have associated vertebral body edema.  The edema is along the superior endplate of T2 and more diffuse at T3.  These are both new compared to the prior MRI.  No retropulsion is seen.     Degenerative disc changes are also present throughout the thoracic spine with narrowing of the T3-4 through T9-10 disc space.  No significant thoracic spinal canal stenosis.  Tiny right paraspinal disc bulges present at T7-8, and at T8-9 there is a small central disc protrusion which is extruded inferiorly behind the T9 vertebra without cord impingement.     No abnormal cord signal is seen to suggest tumor, edema or myelomalacia.  No paraspinous lesions.       -  MRI lumbar spine 07/2024  T12-L1: Small central protrusion.  No canal or foraminal stenosis.     L1-2: Mild disc bulge.  No canal or foraminal stenosis.     L2-3: Disc bulge.  No canal stenosis.  Mild bilateral foraminal stenosis.     L3-4: Disc bulge.  Bilateral facet arthropathy and ligamentum flavum thickening.  Moderate canal stenosis.  Moderate right and mild left foraminal stenosis.     L4-5: Disc bulge.   Bilateral facet arthropathy and ligamentum flavum thickening.  Mild canal stenosis.  Mild bilateral foraminal stenosis.     L5-S1: Disc bulge.  Bilateral facet arthropathy.  Mild canal stenosis.  Mild bilateral foraminal stenosis.     Impression:     Chronic compression fractures of T11 and T12.  No compression fracture in the lumbar spine.     Degenerative changes as described, similar to 2024.           ASSESSMENT:  Nia Godfrey is a 75 y.o. female with the following diagnoses based on history, exam, and imagin. Chronic bilateral low back pain without sciatica    2. Degeneration of intervertebral disc of lumbar region with discogenic back pain    3. Chronic pain syndrome    This is a pleasant 75 y.o. female patient with PMH hypertension, hyperlipidemia, diabetes, thoracic compression fracture T11-T12, presenting with chronic low back pain that is primarily axial for > 6 months,  She has failed conservative treatments for > 6 months, Has clear lumbar degenerative disc change with a loss of disc height at L5-S1 level that are consistent with the patient's pain pattern  -For further detail the patient has failed the following therapies: Physical Therapy, Home exercise program, Heat/Ice, Tylenol, NSAIDs (ibuprofen, meloxicam), Muscle relaxers (methocarbamol), anticonvulsants including gabapentin and lumbar medial branch block  -The patient has significant impact in ADLs including walking, standing, getting dressed, bending, lifting, etc...  -Their pain score is consistently >9/10  -My review of the patient's history and physical exam are consistent with a diagnosis of  discogenic back pain due to lumbar degenerative disc disease    Treatment Plan:    Diagnostics/Referrals:  Continue with a home exercise    Medications:    NSAIDs: OTC  Topical Agent: Yes  TCA/SSRI/SNRI: None  Anti-convulsants:  DC gabapentin due to lack of efficacy  Muscle Relaxants:  DC Robaxin due to lack of efficacy  Opioids:  None    Interventional Therapy: Please schedule for  via disc procedure at L5-S1  Sedation:  IV sedation, 2 g of Ancef  Clearance to stop Blood thinner: Anticoagulation drugs: None    Regarding the above interventions, the patient has been educated regarding the risks (including bleeding, infection, increased pain, nerve damage, or allergic reaction), benefits, and alternatives. The patient states she understands and is eager to proceed.    Physical Rehabilitation: HEP.    Patient Education: Counseled patient regarding the importance of activity modification, I have stressed the importance of physical activity and a home exercise plan to help with pain and improve health.    Follow-up: RTC  as discussed    May consider:  Intracept at L5-S1     Eulalio Fajardo MD  Anesthesiologist  Interventional Pain Medicine  02/25/2025    Disclaimer:  This note was prepared using voice recognition system and is likely to have sound alike errors that may have been overlooked even after proof reading.  Please call me with any questions.

## 2025-02-28 ENCOUNTER — TELEPHONE (OUTPATIENT)
Dept: PAIN MEDICINE | Facility: CLINIC | Age: 75
End: 2025-02-28

## 2025-02-28 NOTE — TELEPHONE ENCOUNTER
----- Message from Eulalio Fajardo MD sent at 2/27/2025 10:06 PM CST -----  Linda, Please call the patient to inform that VIA disc is not option now due to insurance.The other option is Burning the nerve inside the vertebral bodyIf she agrees so we can schedule her:· Interventional Therapy: Intracept at L5 and S1· Sedation:  IV sedation, 2 g of Ancef· Clearance to stop Blood thinner: Anticoagulation drugs: None

## 2025-02-28 NOTE — TELEPHONE ENCOUNTER
Spoke with patient. Notified that Viadisc is not an option at this time. States she does not want to proceed with RFA, as the block did not help any. States the Gabapentin velázquez not help either and is willing to try something else. States she has not tried Lyrica in the past, if that is an option.    Tarsorrhaphy Text: A tarsorrhaphy was performed using Frost sutures.

## 2025-03-26 ENCOUNTER — PATIENT MESSAGE (OUTPATIENT)
Dept: PAIN MEDICINE | Facility: CLINIC | Age: 75
End: 2025-03-26
Payer: MEDICARE

## 2025-03-26 NOTE — TELEPHONE ENCOUNTER
Left another message for patient to contact clinic. Phone does not ring, goes straight to voicemail.

## 2025-03-28 NOTE — TELEPHONE ENCOUNTER
----- Message from Eulalio Fajardo MD sent at 2/25/2025 11:53 AM CST -----  · Interventional Therapy: Please schedule for VIA disc at L5-S1· Sedation:  IV sedation, 2 gram ancef· Clearance to stop Blood thinner: Anticoagulation drugs: None   none

## 2025-03-31 ENCOUNTER — TELEPHONE (OUTPATIENT)
Dept: PAIN MEDICINE | Facility: CLINIC | Age: 75
End: 2025-03-31
Payer: MEDICARE

## 2025-03-31 NOTE — TELEPHONE ENCOUNTER
----- Message from Kristel sent at 3/28/2025 10:01 AM CDT -----  Contact: Patient    ----- Message -----  From: Jane Mosquera  Sent: 3/28/2025   9:19 AM CDT  To: Scotty Tsai Staff    Nia GodfreyMRN: [id number]: 1950PCP: Cristiana CarrilloHome Phone      Not on file.Work Phone      [phone]          [phone]: Patient is returning a call from 3/26. She is not sure what is was for.PHONE: [phone]

## 2025-06-27 DIAGNOSIS — I73.9 PERIPHERAL VASCULAR DISEASE, UNSPECIFIED: ICD-10-CM

## 2025-06-27 DIAGNOSIS — I73.9 PERIPHERAL VASCULAR DISEASE: Primary | ICD-10-CM

## 2025-07-07 ENCOUNTER — TELEPHONE (OUTPATIENT)
Dept: VASCULAR SURGERY | Facility: CLINIC | Age: 75
End: 2025-07-07
Payer: MEDICARE

## 2025-07-07 DIAGNOSIS — I73.9 PAD (PERIPHERAL ARTERY DISEASE): Primary | ICD-10-CM

## 2025-07-07 NOTE — TELEPHONE ENCOUNTER
Spoke to pt. Advised she needs to have ABIs on the day of her appt with Dr. Pineda. Scheduled ABIs on 7/16/25 at 12:30 pm with appt w/ Dr. Pineda to follow immediately after. Patient verbalized understanding and had no further questions.

## 2025-07-16 ENCOUNTER — INITIAL CONSULT (OUTPATIENT)
Dept: VASCULAR SURGERY | Facility: CLINIC | Age: 75
End: 2025-07-16
Payer: MEDICARE

## 2025-07-16 ENCOUNTER — HOSPITAL ENCOUNTER (OUTPATIENT)
Dept: VASCULAR SURGERY | Facility: CLINIC | Age: 75
Discharge: HOME | End: 2025-07-16
Attending: SURGERY
Payer: MEDICARE

## 2025-07-16 VITALS
SYSTOLIC BLOOD PRESSURE: 138 MMHG | HEIGHT: 62 IN | DIASTOLIC BLOOD PRESSURE: 61 MMHG | WEIGHT: 167.56 LBS | TEMPERATURE: 98 F | BODY MASS INDEX: 30.83 KG/M2 | HEART RATE: 58 BPM

## 2025-07-16 DIAGNOSIS — I73.9 PERIPHERAL VASCULAR DISEASE: ICD-10-CM

## 2025-07-16 DIAGNOSIS — I73.9 PAD (PERIPHERAL ARTERY DISEASE): ICD-10-CM

## 2025-07-16 DIAGNOSIS — I73.9 PERIPHERAL VASCULAR DISEASE, UNSPECIFIED: ICD-10-CM

## 2025-07-16 PROCEDURE — 93923 UPR/LXTR ART STDY 3+ LVLS: CPT | Mod: S$GLB,,, | Performed by: SURGERY

## 2025-07-16 PROCEDURE — 99203 OFFICE O/P NEW LOW 30 MIN: CPT | Mod: S$GLB,,, | Performed by: SURGERY

## 2025-07-16 PROCEDURE — 99999 PR PBB SHADOW E&M-EST. PATIENT-LVL III: CPT | Mod: PBBFAC,,, | Performed by: SURGERY

## 2025-07-16 RX ORDER — ALLOPURINOL 100 MG/1
TABLET ORAL
COMMUNITY

## 2025-07-16 RX ORDER — AMLODIPINE AND BENAZEPRIL HYDROCHLORIDE 10; 40 MG/1; MG/1
1 CAPSULE ORAL
COMMUNITY

## 2025-07-16 RX ORDER — ALBUTEROL SULFATE 90 UG/1
2 INHALANT RESPIRATORY (INHALATION)
COMMUNITY

## 2025-07-16 RX ORDER — GABAPENTIN 300 MG/1
1 CAPSULE ORAL 3 TIMES DAILY
COMMUNITY

## 2025-07-16 RX ORDER — COLESTIPOL HYDROCHLORIDE 1 G/1
TABLET ORAL
COMMUNITY

## 2025-07-16 NOTE — PROGRESS NOTES
VASCULAR SURGERY CLINIC NOTE    Patient ID: Nia Godfrey is a 75 y.o. female.    I. HISTORY     Chief Complaint: RLE discoloration surrounding her ankle    HPI: Nia Godfrey is a 75 y.o. female who is here today for evaluation of RLE discoloration surrounding her ankle. Pt reports that she intermittently experiences discoloration of her ankle on her right leg; she states that the color can vary from a dark tan/brown color to black. Pt has no pain in her calves when walking long distances; her walking is more so limited from chronic back pain. She denies any rest pain. She has no personal history of blood clots, heart attacks, or strokes. She has a family history of heart attacks.       Past Medical History:   Diagnosis Date    Allergy     Asthma     Diabetes mellitus, type 2     Hypertension     Osteopenia         Past Surgical History:   Procedure Laterality Date    CHOLECYSTECTOMY      HYSTERECTOMY      INJECTION OF ANESTHETIC AGENT AROUND MEDIAL BRANCH NERVES INNERVATING LUMBAR FACET JOINT Bilateral 2/21/2025    Procedure: LUMBAR MEDIAL BRANCH NERVE BLOCK (L3,4,5) (ORAL);  Surgeon: Eulalio Fajardo MD;  Location: [facility];  Service: Pain Management;  Laterality: Bilateral;    INJECTION OF ANESTHETIC AGENT AROUND NERVE Bilateral 5/17/2024    Procedure: PARAVERTEBRAL BLOCK (T10);  Surgeon: Eulalio Fajardo MD;  Location: [facility];  Service: Pain Management;  Laterality: Bilateral;       Social History     Occupational History    Not on file   Tobacco Use    Smoking status: Every Day     Types: Cigarettes    Smokeless tobacco: Never   Substance and Sexual Activity    Alcohol use: Not on file    Drug use: Not on file    Sexual activity: Not on file       Current Medications[1]    Review of Systems   Constitutional: Negative for chills and fever.   Cardiovascular:  Negative for chest pain, claudication and leg swelling.   Respiratory:  Negative for shortness of breath.    Skin:  Positive for color change.    Musculoskeletal:  Positive for back pain and muscle cramps.   Gastrointestinal:  Negative for abdominal pain.         II. PHYSICAL EXAM     Physical Exam  Constitutional:       Appearance: Normal appearance.   HENT:      Head: Normocephalic and atraumatic.      Mouth/Throat:      Mouth: Mucous membranes are moist.   Eyes:      Extraocular Movements: Extraocular movements intact.      Conjunctiva/sclera: Conjunctivae normal.   Cardiovascular:      Pulses: Normal pulses.      Comments: Bilateral palpable PT and DP  Pulmonary:      Effort: Pulmonary effort is normal.   Abdominal:      General: Abdomen is flat.      Palpations: Abdomen is soft.   Skin:     Comments: Tan discoloration surrounding her right ankle.    Neurological:      General: No focal deficit present.      Mental Status: She is alert.   Psychiatric:         Mood and Affect: Mood normal.         Behavior: Behavior normal.         Thought Content: Thought content normal.         Judgment: Judgment normal.       US Ankle Brachial Indices Resting 7/16/2025  Right CLARA: 0.59  Left CLARA: 0.78    III. ASSESSMENT & PLAN (MEDICAL DECISION MAKING)     1. Peripheral vascular disease    2. Peripheral vascular disease, unspecified          Assessment/Diagnosis and Plan:  75 y.o. female here for evaluation of RLE discoloration. Pt does not have any symptoms indicative of PAD; she denies any claudication or rest pain. Pt's RLE discoloration is likely due to some level of venous congestion in her right lower extremity.     - Encouraged pt to elevate legs at home and wear compression stockings  - Return to clinic as needed for any new concerns.       [1]   Current Outpatient Medications:     albuterol (PROVENTIL/VENTOLIN HFA) 90 mcg/actuation inhaler, 2 puffs every 4 to 6 hours as needed., Disp: , Rfl:     allopurinoL (ZYLOPRIM) 100 MG tablet, TAKE 1 TABLET EVERY DAY BY ORAL ROUTE, FOR DECREASE URIC ACID., Disp: , Rfl:     amLODIPine-benazepriL (LOTREL) 10-40 mg per  capsule, Take 1 capsule by mouth., Disp: , Rfl:     calcitonin, salmon, (FORTICAL) 200 unit/actuation nasal spray, 1 spray by Nasal route once daily., Disp: 1 mL, Rfl: 0    colestipoL (COLESTID) 1 gram Tab, TAKE 2 TABLETS 3 TIMES A DAY BY ORAL ROUTE BEFORE MEALS FOR 30 DAYS., Disp: , Rfl:     gabapentin (NEURONTIN) 300 MG capsule, Take 1 capsule by mouth 3 (three) times daily., Disp: , Rfl:     JANUVIA 100 mg Tab, Take 100 mg by mouth., Disp: , Rfl:     meloxicam (MOBIC) 15 MG tablet, Take 1 tablet (15 mg total) by mouth once daily., Disp: 14 tablet, Rfl: 0    montelukast (SINGULAIR) 10 mg tablet, Take 10 mg by mouth., Disp: , Rfl:

## (undated) DEVICE — MARKER SKIN RULER AND LABEL

## (undated) DEVICE — CHLORAPREP 10.5 ML APPLICATOR

## (undated) DEVICE — KIT NERVE BLOCK PREP BAPTIST